# Patient Record
Sex: FEMALE | Race: WHITE | NOT HISPANIC OR LATINO | URBAN - METROPOLITAN AREA
[De-identification: names, ages, dates, MRNs, and addresses within clinical notes are randomized per-mention and may not be internally consistent; named-entity substitution may affect disease eponyms.]

---

## 2017-08-22 ENCOUNTER — INPATIENT (INPATIENT)
Facility: HOSPITAL | Age: 82
LOS: 10 days | Discharge: NURSING, REHAB OR LONG-TERM CARE | DRG: 291 | End: 2017-09-02
Attending: HOSPITALIST
Payer: MEDICARE

## 2017-08-22 VITALS
RESPIRATION RATE: 18 BRPM | HEART RATE: 90 BPM | OXYGEN SATURATION: 100 % | HEIGHT: 61 IN | WEIGHT: 199.96 LBS | SYSTOLIC BLOOD PRESSURE: 167 MMHG | TEMPERATURE: 98 F | DIASTOLIC BLOOD PRESSURE: 85 MMHG

## 2017-08-22 DIAGNOSIS — I10 ESSENTIAL (PRIMARY) HYPERTENSION: ICD-10-CM

## 2017-08-22 DIAGNOSIS — E11.9 TYPE 2 DIABETES MELLITUS WITHOUT COMPLICATIONS: ICD-10-CM

## 2017-08-22 DIAGNOSIS — J44.1 CHRONIC OBSTRUCTIVE PULMONARY DISEASE WITH (ACUTE) EXACERBATION: ICD-10-CM

## 2017-08-22 DIAGNOSIS — J44.9 CHRONIC OBSTRUCTIVE PULMONARY DISEASE, UNSPECIFIED: ICD-10-CM

## 2017-08-22 DIAGNOSIS — I50.9 HEART FAILURE, UNSPECIFIED: ICD-10-CM

## 2017-08-22 DIAGNOSIS — E78.00 PURE HYPERCHOLESTEROLEMIA, UNSPECIFIED: ICD-10-CM

## 2017-08-22 DIAGNOSIS — N17.9 ACUTE KIDNEY FAILURE, UNSPECIFIED: ICD-10-CM

## 2017-08-22 LAB
ALBUMIN SERPL ELPH-MCNC: 4.1 G/DL — SIGNIFICANT CHANGE UP (ref 3.3–5.2)
ALP SERPL-CCNC: 106 U/L — SIGNIFICANT CHANGE UP (ref 40–120)
ALT FLD-CCNC: 20 U/L — SIGNIFICANT CHANGE UP
ANION GAP SERPL CALC-SCNC: 15 MMOL/L — SIGNIFICANT CHANGE UP (ref 5–17)
ANISOCYTOSIS BLD QL: SLIGHT — SIGNIFICANT CHANGE UP
AST SERPL-CCNC: 29 U/L — SIGNIFICANT CHANGE UP
BILIRUB SERPL-MCNC: 0.3 MG/DL — LOW (ref 0.4–2)
BUN SERPL-MCNC: 39 MG/DL — HIGH (ref 8–20)
CALCIUM SERPL-MCNC: 9.1 MG/DL — SIGNIFICANT CHANGE UP (ref 8.6–10.2)
CHLORIDE SERPL-SCNC: 95 MMOL/L — LOW (ref 98–107)
CK SERPL-CCNC: 89 U/L — SIGNIFICANT CHANGE UP (ref 25–170)
CO2 SERPL-SCNC: 21 MMOL/L — LOW (ref 22–29)
CREAT SERPL-MCNC: 1.73 MG/DL — HIGH (ref 0.5–1.3)
EOSINOPHIL NFR BLD AUTO: 1 % — SIGNIFICANT CHANGE UP (ref 0–5)
GLUCOSE SERPL-MCNC: 319 MG/DL — HIGH (ref 70–115)
HCT VFR BLD CALC: 29.9 % — LOW (ref 37–47)
HGB BLD-MCNC: 9.7 G/DL — LOW (ref 12–16)
HYPOCHROMIA BLD QL: SLIGHT — SIGNIFICANT CHANGE UP
LYMPHOCYTES # BLD AUTO: 4 % — LOW (ref 20–55)
MACROCYTES BLD QL: SLIGHT — SIGNIFICANT CHANGE UP
MCHC RBC-ENTMCNC: 29.7 PG — SIGNIFICANT CHANGE UP (ref 27–31)
MCHC RBC-ENTMCNC: 32.4 G/DL — SIGNIFICANT CHANGE UP (ref 32–36)
MCV RBC AUTO: 91.4 FL — SIGNIFICANT CHANGE UP (ref 81–99)
MONOCYTES NFR BLD AUTO: 9 % — SIGNIFICANT CHANGE UP (ref 3–10)
NEUTROPHILS NFR BLD AUTO: 81 % — HIGH (ref 37–73)
NEUTS BAND # BLD: 3 % — SIGNIFICANT CHANGE UP (ref 0–8)
NT-PROBNP SERPL-SCNC: 4145 PG/ML — HIGH (ref 0–300)
OVALOCYTES BLD QL SMEAR: SLIGHT — SIGNIFICANT CHANGE UP
PLAT MORPH BLD: NORMAL — SIGNIFICANT CHANGE UP
PLATELET # BLD AUTO: 86 K/UL — LOW (ref 150–400)
POIKILOCYTOSIS BLD QL AUTO: SLIGHT — SIGNIFICANT CHANGE UP
POTASSIUM SERPL-MCNC: 4.6 MMOL/L — SIGNIFICANT CHANGE UP (ref 3.5–5.3)
POTASSIUM SERPL-SCNC: 4.6 MMOL/L — SIGNIFICANT CHANGE UP (ref 3.5–5.3)
PROT SERPL-MCNC: 7 G/DL — SIGNIFICANT CHANGE UP (ref 6.6–8.7)
RBC # BLD: 3.27 M/UL — LOW (ref 4.4–5.2)
RBC # FLD: 13.5 % — SIGNIFICANT CHANGE UP (ref 11–15.6)
RBC BLD AUTO: ABNORMAL
SODIUM SERPL-SCNC: 131 MMOL/L — LOW (ref 135–145)
TROPONIN T SERPL-MCNC: 0.03 NG/ML — SIGNIFICANT CHANGE UP (ref 0–0.06)
VARIANT LYMPHS # BLD: 2 % — SIGNIFICANT CHANGE UP (ref 0–6)
WBC # BLD: 12.3 K/UL — HIGH (ref 4.8–10.8)
WBC # FLD AUTO: 12.3 K/UL — HIGH (ref 4.8–10.8)

## 2017-08-22 PROCEDURE — 99285 EMERGENCY DEPT VISIT HI MDM: CPT | Mod: 25

## 2017-08-22 PROCEDURE — 93010 ELECTROCARDIOGRAM REPORT: CPT

## 2017-08-22 PROCEDURE — 93306 TTE W/DOPPLER COMPLETE: CPT | Mod: 26

## 2017-08-22 PROCEDURE — 99223 1ST HOSP IP/OBS HIGH 75: CPT | Mod: AI

## 2017-08-22 PROCEDURE — 71020: CPT | Mod: 26

## 2017-08-22 RX ORDER — GLUCAGON INJECTION, SOLUTION 0.5 MG/.1ML
1 INJECTION, SOLUTION SUBCUTANEOUS ONCE
Qty: 0 | Refills: 0 | Status: DISCONTINUED | OUTPATIENT
Start: 2017-08-22 | End: 2017-09-02

## 2017-08-22 RX ORDER — AZITHROMYCIN 500 MG/1
500 TABLET, FILM COATED ORAL ONCE
Qty: 0 | Refills: 0 | Status: COMPLETED | OUTPATIENT
Start: 2017-08-22 | End: 2017-08-22

## 2017-08-22 RX ORDER — DORZOLAMIDE HYDROCHLORIDE 20 MG/ML
1 SOLUTION/ DROPS OPHTHALMIC
Qty: 0 | Refills: 0 | Status: DISCONTINUED | OUTPATIENT
Start: 2017-08-22 | End: 2017-09-02

## 2017-08-22 RX ORDER — METOPROLOL TARTRATE 50 MG
100 TABLET ORAL
Qty: 0 | Refills: 0 | Status: DISCONTINUED | OUTPATIENT
Start: 2017-08-22 | End: 2017-09-02

## 2017-08-22 RX ORDER — SODIUM CHLORIDE 9 MG/ML
1000 INJECTION, SOLUTION INTRAVENOUS
Qty: 0 | Refills: 0 | Status: DISCONTINUED | OUTPATIENT
Start: 2017-08-22 | End: 2017-09-02

## 2017-08-22 RX ORDER — ALBUTEROL 90 UG/1
2.5 AEROSOL, METERED ORAL ONCE
Qty: 0 | Refills: 0 | Status: COMPLETED | OUTPATIENT
Start: 2017-08-22 | End: 2017-08-22

## 2017-08-22 RX ORDER — IPRATROPIUM/ALBUTEROL SULFATE 18-103MCG
3 AEROSOL WITH ADAPTER (GRAM) INHALATION EVERY 6 HOURS
Qty: 0 | Refills: 0 | Status: DISCONTINUED | OUTPATIENT
Start: 2017-08-22 | End: 2017-08-28

## 2017-08-22 RX ORDER — AZITHROMYCIN 500 MG/1
250 TABLET, FILM COATED ORAL DAILY
Qty: 0 | Refills: 0 | Status: COMPLETED | OUTPATIENT
Start: 2017-08-23 | End: 2017-08-26

## 2017-08-22 RX ORDER — DEXTROSE 50 % IN WATER 50 %
25 SYRINGE (ML) INTRAVENOUS ONCE
Qty: 0 | Refills: 0 | Status: DISCONTINUED | OUTPATIENT
Start: 2017-08-22 | End: 2017-09-02

## 2017-08-22 RX ORDER — CLOPIDOGREL BISULFATE 75 MG/1
75 TABLET, FILM COATED ORAL DAILY
Qty: 0 | Refills: 0 | Status: DISCONTINUED | OUTPATIENT
Start: 2017-08-22 | End: 2017-09-02

## 2017-08-22 RX ORDER — MAGNESIUM HYDROXIDE 400 MG/1
15 TABLET, CHEWABLE ORAL AT BEDTIME
Qty: 0 | Refills: 0 | Status: DISCONTINUED | OUTPATIENT
Start: 2017-08-22 | End: 2017-09-02

## 2017-08-22 RX ORDER — SODIUM CHLORIDE 9 MG/ML
3 INJECTION INTRAMUSCULAR; INTRAVENOUS; SUBCUTANEOUS ONCE
Qty: 0 | Refills: 0 | Status: COMPLETED | OUTPATIENT
Start: 2017-08-22 | End: 2017-08-22

## 2017-08-22 RX ORDER — BENZOCAINE AND MENTHOL 5; 1 G/100ML; G/100ML
1 LIQUID ORAL EVERY 6 HOURS
Qty: 0 | Refills: 0 | Status: DISCONTINUED | OUTPATIENT
Start: 2017-08-22 | End: 2017-09-02

## 2017-08-22 RX ORDER — DEXTROSE 50 % IN WATER 50 %
12.5 SYRINGE (ML) INTRAVENOUS ONCE
Qty: 0 | Refills: 0 | Status: DISCONTINUED | OUTPATIENT
Start: 2017-08-22 | End: 2017-09-02

## 2017-08-22 RX ORDER — HYDRALAZINE HCL 50 MG
25 TABLET ORAL
Qty: 0 | Refills: 0 | Status: DISCONTINUED | OUTPATIENT
Start: 2017-08-22 | End: 2017-09-02

## 2017-08-22 RX ORDER — ASCORBIC ACID 60 MG
500 TABLET,CHEWABLE ORAL DAILY
Qty: 0 | Refills: 0 | Status: DISCONTINUED | OUTPATIENT
Start: 2017-08-22 | End: 2017-09-02

## 2017-08-22 RX ORDER — ENOXAPARIN SODIUM 100 MG/ML
40 INJECTION SUBCUTANEOUS EVERY 24 HOURS
Qty: 0 | Refills: 0 | Status: DISCONTINUED | OUTPATIENT
Start: 2017-08-22 | End: 2017-09-02

## 2017-08-22 RX ORDER — LORATADINE 10 MG/1
10 TABLET ORAL DAILY
Qty: 0 | Refills: 0 | Status: DISCONTINUED | OUTPATIENT
Start: 2017-08-22 | End: 2017-09-02

## 2017-08-22 RX ORDER — ACETAMINOPHEN 500 MG
650 TABLET ORAL EVERY 6 HOURS
Qty: 0 | Refills: 0 | Status: DISCONTINUED | OUTPATIENT
Start: 2017-08-22 | End: 2017-09-02

## 2017-08-22 RX ORDER — IPRATROPIUM/ALBUTEROL SULFATE 18-103MCG
3 AEROSOL WITH ADAPTER (GRAM) INHALATION ONCE
Qty: 0 | Refills: 0 | Status: COMPLETED | OUTPATIENT
Start: 2017-08-22 | End: 2017-08-22

## 2017-08-22 RX ORDER — ATORVASTATIN CALCIUM 80 MG/1
40 TABLET, FILM COATED ORAL AT BEDTIME
Qty: 0 | Refills: 0 | Status: DISCONTINUED | OUTPATIENT
Start: 2017-08-22 | End: 2017-09-02

## 2017-08-22 RX ORDER — MULTIVIT-MIN/FERROUS GLUCONATE 9 MG/15 ML
1 LIQUID (ML) ORAL DAILY
Qty: 0 | Refills: 0 | Status: DISCONTINUED | OUTPATIENT
Start: 2017-08-22 | End: 2017-09-02

## 2017-08-22 RX ORDER — MAGNESIUM SULFATE 500 MG/ML
2 VIAL (ML) INJECTION ONCE
Qty: 0 | Refills: 0 | Status: COMPLETED | OUTPATIENT
Start: 2017-08-22 | End: 2017-08-22

## 2017-08-22 RX ORDER — ASPIRIN/CALCIUM CARB/MAGNESIUM 324 MG
81 TABLET ORAL DAILY
Qty: 0 | Refills: 0 | Status: DISCONTINUED | OUTPATIENT
Start: 2017-08-22 | End: 2017-09-02

## 2017-08-22 RX ORDER — CELECOXIB 200 MG/1
200 CAPSULE ORAL
Qty: 0 | Refills: 0 | Status: DISCONTINUED | OUTPATIENT
Start: 2017-08-22 | End: 2017-08-24

## 2017-08-22 RX ORDER — LIDOCAINE 4 G/100G
10 CREAM TOPICAL ONCE
Qty: 0 | Refills: 0 | Status: COMPLETED | OUTPATIENT
Start: 2017-08-22 | End: 2017-08-22

## 2017-08-22 RX ORDER — DEXTROSE 50 % IN WATER 50 %
1 SYRINGE (ML) INTRAVENOUS ONCE
Qty: 0 | Refills: 0 | Status: DISCONTINUED | OUTPATIENT
Start: 2017-08-22 | End: 2017-09-02

## 2017-08-22 RX ORDER — INSULIN LISPRO 100/ML
VIAL (ML) SUBCUTANEOUS
Qty: 0 | Refills: 0 | Status: DISCONTINUED | OUTPATIENT
Start: 2017-08-22 | End: 2017-08-26

## 2017-08-22 RX ORDER — INSULIN GLARGINE 100 [IU]/ML
25 INJECTION, SOLUTION SUBCUTANEOUS AT BEDTIME
Qty: 0 | Refills: 0 | Status: DISCONTINUED | OUTPATIENT
Start: 2017-08-22 | End: 2017-08-31

## 2017-08-22 RX ORDER — LATANOPROST 0.05 MG/ML
1 SOLUTION/ DROPS OPHTHALMIC; TOPICAL AT BEDTIME
Qty: 0 | Refills: 0 | Status: DISCONTINUED | OUTPATIENT
Start: 2017-08-22 | End: 2017-09-02

## 2017-08-22 RX ORDER — FUROSEMIDE 40 MG
40 TABLET ORAL DAILY
Qty: 0 | Refills: 0 | Status: DISCONTINUED | OUTPATIENT
Start: 2017-08-22 | End: 2017-08-24

## 2017-08-22 RX ADMIN — Medication 8: at 18:16

## 2017-08-22 RX ADMIN — Medication 100 MILLIGRAM(S): at 18:36

## 2017-08-22 RX ADMIN — LATANOPROST 1 DROP(S): 0.05 SOLUTION/ DROPS OPHTHALMIC; TOPICAL at 22:39

## 2017-08-22 RX ADMIN — LIDOCAINE 10 MILLILITER(S): 4 CREAM TOPICAL at 06:04

## 2017-08-22 RX ADMIN — BENZOCAINE AND MENTHOL 1 LOZENGE: 5; 1 LIQUID ORAL at 13:02

## 2017-08-22 RX ADMIN — CLOPIDOGREL BISULFATE 75 MILLIGRAM(S): 75 TABLET, FILM COATED ORAL at 13:01

## 2017-08-22 RX ADMIN — Medication 500 MILLIGRAM(S): at 12:59

## 2017-08-22 RX ADMIN — DORZOLAMIDE HYDROCHLORIDE 1 DROP(S): 20 SOLUTION/ DROPS OPHTHALMIC at 18:36

## 2017-08-22 RX ADMIN — Medication 81 MILLIGRAM(S): at 12:57

## 2017-08-22 RX ADMIN — Medication 50 GRAM(S): at 08:03

## 2017-08-22 RX ADMIN — ATORVASTATIN CALCIUM 40 MILLIGRAM(S): 80 TABLET, FILM COATED ORAL at 22:27

## 2017-08-22 RX ADMIN — Medication 100 MILLIGRAM(S): at 18:18

## 2017-08-22 RX ADMIN — ALBUTEROL 2.5 MILLIGRAM(S): 90 AEROSOL, METERED ORAL at 06:04

## 2017-08-22 RX ADMIN — BENZOCAINE AND MENTHOL 1 LOZENGE: 5; 1 LIQUID ORAL at 22:26

## 2017-08-22 RX ADMIN — LORATADINE 10 MILLIGRAM(S): 10 TABLET ORAL at 13:03

## 2017-08-22 RX ADMIN — Medication 3 MILLILITER(S): at 04:20

## 2017-08-22 RX ADMIN — Medication 40 MILLIGRAM(S): at 12:57

## 2017-08-22 RX ADMIN — Medication 1 TABLET(S): at 13:03

## 2017-08-22 RX ADMIN — Medication 30 MILLILITER(S): at 06:04

## 2017-08-22 RX ADMIN — SODIUM CHLORIDE 3 MILLILITER(S): 9 INJECTION INTRAMUSCULAR; INTRAVENOUS; SUBCUTANEOUS at 04:20

## 2017-08-22 RX ADMIN — Medication 8: at 13:01

## 2017-08-22 RX ADMIN — Medication 3 MILLILITER(S): at 20:18

## 2017-08-22 RX ADMIN — ENOXAPARIN SODIUM 40 MILLIGRAM(S): 100 INJECTION SUBCUTANEOUS at 12:57

## 2017-08-22 RX ADMIN — Medication 25 MILLIGRAM(S): at 18:36

## 2017-08-22 RX ADMIN — INSULIN GLARGINE 25 UNIT(S): 100 INJECTION, SOLUTION SUBCUTANEOUS at 22:26

## 2017-08-22 RX ADMIN — Medication 125 MILLIGRAM(S): at 04:20

## 2017-08-22 RX ADMIN — Medication 60 MILLIGRAM(S): at 22:26

## 2017-08-22 RX ADMIN — Medication 3 MILLILITER(S): at 06:04

## 2017-08-22 RX ADMIN — CELECOXIB 200 MILLIGRAM(S): 200 CAPSULE ORAL at 18:16

## 2017-08-22 RX ADMIN — AZITHROMYCIN 500 MILLIGRAM(S): 500 TABLET, FILM COATED ORAL at 12:57

## 2017-08-22 RX ADMIN — Medication 60 MILLIGRAM(S): at 14:36

## 2017-08-22 NOTE — H&P ADULT - ASSESSMENT
89 yr old female resident of [facility] with diabetes mellitus, hypertension, hyperlipidemia, CHF, COPD admitted with complaints of exertional shortness of breath and wheezing. Labs and imaging noted, no infiltrate in CXR. Noted to have DEVORAH, compared to last admission.

## 2017-08-22 NOTE — ED PROVIDER NOTE - OBJECTIVE STATEMENT
89 yo female presents for evaluation of shortness of breath. Patient states for evaluation shortness and wheezing this evening. Patient states that she has been generally well. However, she has noted a mild cough for the past 3 days. Tonight while sleeping she was awake due to dyspnea. She was given a treatment a home with significant improvement.

## 2017-08-22 NOTE — ED ADULT TRIAGE NOTE - CHIEF COMPLAINT QUOTE
marysol arrived via ambulance states that she was having difficulty breathing  about one hour prior to arrival patient received a respitory treatment prior to arrival -states that she has had a cough for a while. carlot on 3 liters nasal cannula with wheezing noted. Md at bedside. lower edema noted

## 2017-08-22 NOTE — ED PROVIDER NOTE - PSH
Artificial pacemaker    History of appendectomy    History of tonsillectomy    S/P PHOEBE (total abdominal hysterectomy)

## 2017-08-22 NOTE — ED ADULT NURSE REASSESSMENT NOTE - NS ED NURSE REASSESS COMMENT FT1
Pt states she feels relief of discomfort, wheezing has subsided, medications to be given for cough and scratchy throat, will continue to monitor.

## 2017-08-22 NOTE — ED ADULT NURSE REASSESSMENT NOTE - NS ED NURSE REASSESS COMMENT FT1
pt received awake and alert resp even and unlabored denies any pain lung sounds clear pt has a non productive cough. pt incont of large amount of soft brown stool care provided. pt skin intact. pt aware she will be admitted to hospital awaiting bed assignment.

## 2017-08-22 NOTE — ED PROVIDER NOTE - PROGRESS NOTE DETAILS
Patient continues to feel much better. Mild scattered wheezing rul and lll. Patient with continued wheezing despite multiple treatments. Will continued for persistent treatment

## 2017-08-22 NOTE — ED ADULT NURSE REASSESSMENT NOTE - NS ED NURSE REASSESS COMMENT FT1
pt care assumed at 1930, no apparent distress noted at this time, charting as noted. pt received Alert and Oriented to person, place, and time laying in bed comfortably. Pt denies any complaints at this time. Pt assisted to bed pan. HR is regular, lung sounds are clear b/l, abd is soft and nontender with positive bowel sounds in all four quadrants, skin is warm, dry and   appropriate for age and race. plan of care explained, will continue to monitor.

## 2017-08-22 NOTE — H&P ADULT - HISTORY OF PRESENT ILLNESS
89 yr old female resident [facility] with diabetes mellitus, hypertension, hyperlipidemia, CHF, COPD admitted with complaints of few days of shortness of breath, worse on exertion. Yesterday, she had a cough with scant sputum. She has chronic leg swelling. No chest pain, dizziness, no palpitations. No fever, chills, nausea, vomiting, bowel/bladder complaints. She is unsure of sick contacts.

## 2017-08-22 NOTE — ED PROVIDER NOTE - PMH
Congestive heart failure (CHF)    COPD (chronic obstructive pulmonary disease)    Diabetes    Hiatal hernia    Hypercholesteremia    Hypertension    Pacemaker

## 2017-08-22 NOTE — ED ADULT NURSE NOTE - OBJECTIVE STATEMENT
Pt states "I was sleeping and woke up and felt like I couldn't breathe, this has happened to me before but they gave me medicine and I felt better but this was different", pt denies pain/n/v, denies dizziness/weakness, LS w/ audible wheeze on expiration, pt states she's had a cough for 1.5 weeks that won't go away

## 2017-08-23 LAB
ANION GAP SERPL CALC-SCNC: 13 MMOL/L — SIGNIFICANT CHANGE UP (ref 5–17)
ANISOCYTOSIS BLD QL: SLIGHT — SIGNIFICANT CHANGE UP
BUN SERPL-MCNC: 48 MG/DL — HIGH (ref 8–20)
CALCIUM SERPL-MCNC: 9.3 MG/DL — SIGNIFICANT CHANGE UP (ref 8.6–10.2)
CHLORIDE SERPL-SCNC: 96 MMOL/L — LOW (ref 98–107)
CO2 SERPL-SCNC: 24 MMOL/L — SIGNIFICANT CHANGE UP (ref 22–29)
CREAT SERPL-MCNC: 1.83 MG/DL — HIGH (ref 0.5–1.3)
GLUCOSE SERPL-MCNC: 376 MG/DL — HIGH (ref 70–115)
HBA1C BLD-MCNC: 8.8 % — HIGH (ref 4–5.6)
HCT VFR BLD CALC: 29.2 % — LOW (ref 37–47)
HGB BLD-MCNC: 9.5 G/DL — LOW (ref 12–16)
HYPOCHROMIA BLD QL: SLIGHT — SIGNIFICANT CHANGE UP
INR BLD: 1.21 RATIO — HIGH (ref 0.88–1.16)
LYMPHOCYTES # BLD AUTO: 0.8 K/UL — LOW (ref 1–4.8)
LYMPHOCYTES # BLD AUTO: 6 % — LOW (ref 20–55)
MACROCYTES BLD QL: SLIGHT — SIGNIFICANT CHANGE UP
MAGNESIUM SERPL-MCNC: 2.7 MG/DL — HIGH (ref 1.6–2.6)
MCHC RBC-ENTMCNC: 29.1 PG — SIGNIFICANT CHANGE UP (ref 27–31)
MCHC RBC-ENTMCNC: 32.5 G/DL — SIGNIFICANT CHANGE UP (ref 32–36)
MCV RBC AUTO: 89.6 FL — SIGNIFICANT CHANGE UP (ref 81–99)
MICROCYTES BLD QL: SLIGHT — SIGNIFICANT CHANGE UP
MONOCYTES # BLD AUTO: 1.2 K/UL — HIGH (ref 0–0.8)
MONOCYTES NFR BLD AUTO: 9 % — SIGNIFICANT CHANGE UP (ref 3–10)
NEUTROPHILS # BLD AUTO: 11.3 K/UL — HIGH (ref 1.8–8)
NEUTROPHILS NFR BLD AUTO: 82 % — HIGH (ref 37–73)
NEUTS BAND # BLD: 3 % — SIGNIFICANT CHANGE UP (ref 0–8)
PHOSPHATE SERPL-MCNC: 3.1 MG/DL — SIGNIFICANT CHANGE UP (ref 2.4–4.7)
PLAT MORPH BLD: NORMAL — SIGNIFICANT CHANGE UP
PLATELET # BLD AUTO: 96 K/UL — LOW (ref 150–400)
POIKILOCYTOSIS BLD QL AUTO: SLIGHT — SIGNIFICANT CHANGE UP
POTASSIUM SERPL-MCNC: 5.4 MMOL/L — HIGH (ref 3.5–5.3)
POTASSIUM SERPL-SCNC: 5.4 MMOL/L — HIGH (ref 3.5–5.3)
PROTHROM AB SERPL-ACNC: 13.4 SEC — HIGH (ref 9.8–12.7)
RBC # BLD: 3.26 M/UL — LOW (ref 4.4–5.2)
RBC # FLD: 13.3 % — SIGNIFICANT CHANGE UP (ref 11–15.6)
RBC BLD AUTO: ABNORMAL
SODIUM SERPL-SCNC: 133 MMOL/L — LOW (ref 135–145)
WBC # BLD: 13.5 K/UL — HIGH (ref 4.8–10.8)
WBC # FLD AUTO: 13.5 K/UL — HIGH (ref 4.8–10.8)

## 2017-08-23 PROCEDURE — 99233 SBSQ HOSP IP/OBS HIGH 50: CPT

## 2017-08-23 RX ORDER — ONDANSETRON 8 MG/1
4 TABLET, FILM COATED ORAL EVERY 6 HOURS
Qty: 0 | Refills: 0 | Status: DISCONTINUED | OUTPATIENT
Start: 2017-08-23 | End: 2017-09-02

## 2017-08-23 RX ORDER — METOCLOPRAMIDE HCL 10 MG
10 TABLET ORAL ONCE
Qty: 0 | Refills: 0 | Status: COMPLETED | OUTPATIENT
Start: 2017-08-23 | End: 2017-08-23

## 2017-08-23 RX ORDER — HYDRALAZINE HCL 50 MG
10 TABLET ORAL ONCE
Qty: 0 | Refills: 0 | Status: COMPLETED | OUTPATIENT
Start: 2017-08-23 | End: 2017-08-23

## 2017-08-23 RX ADMIN — AZITHROMYCIN 250 MILLIGRAM(S): 500 TABLET, FILM COATED ORAL at 12:36

## 2017-08-23 RX ADMIN — DORZOLAMIDE HYDROCHLORIDE 1 DROP(S): 20 SOLUTION/ DROPS OPHTHALMIC at 17:57

## 2017-08-23 RX ADMIN — Medication 1 TABLET(S): at 12:39

## 2017-08-23 RX ADMIN — Medication 8: at 16:36

## 2017-08-23 RX ADMIN — Medication 200 MILLIGRAM(S): at 14:51

## 2017-08-23 RX ADMIN — BENZOCAINE AND MENTHOL 1 LOZENGE: 5; 1 LIQUID ORAL at 06:01

## 2017-08-23 RX ADMIN — Medication 8: at 10:57

## 2017-08-23 RX ADMIN — Medication 25 MILLIGRAM(S): at 04:47

## 2017-08-23 RX ADMIN — Medication 3 MILLILITER(S): at 20:03

## 2017-08-23 RX ADMIN — Medication 200 MILLIGRAM(S): at 11:01

## 2017-08-23 RX ADMIN — ENOXAPARIN SODIUM 40 MILLIGRAM(S): 100 INJECTION SUBCUTANEOUS at 12:37

## 2017-08-23 RX ADMIN — LATANOPROST 1 DROP(S): 0.05 SOLUTION/ DROPS OPHTHALMIC; TOPICAL at 22:32

## 2017-08-23 RX ADMIN — Medication 3 MILLILITER(S): at 15:11

## 2017-08-23 RX ADMIN — Medication 3 MILLILITER(S): at 04:39

## 2017-08-23 RX ADMIN — Medication 100 MILLIGRAM(S): at 06:01

## 2017-08-23 RX ADMIN — Medication 40 MILLIGRAM(S): at 06:00

## 2017-08-23 RX ADMIN — Medication 60 MILLIGRAM(S): at 22:31

## 2017-08-23 RX ADMIN — Medication 10: at 08:25

## 2017-08-23 RX ADMIN — Medication 81 MILLIGRAM(S): at 12:36

## 2017-08-23 RX ADMIN — Medication 3 MILLILITER(S): at 08:51

## 2017-08-23 RX ADMIN — Medication 60 MILLIGRAM(S): at 06:00

## 2017-08-23 RX ADMIN — Medication 10 MILLIGRAM(S): at 22:30

## 2017-08-23 RX ADMIN — BENZOCAINE AND MENTHOL 1 LOZENGE: 5; 1 LIQUID ORAL at 14:51

## 2017-08-23 RX ADMIN — Medication 60 MILLIGRAM(S): at 12:39

## 2017-08-23 RX ADMIN — DORZOLAMIDE HYDROCHLORIDE 1 DROP(S): 20 SOLUTION/ DROPS OPHTHALMIC at 06:00

## 2017-08-23 RX ADMIN — Medication 30 MILLILITER(S): at 23:56

## 2017-08-23 RX ADMIN — CLOPIDOGREL BISULFATE 75 MILLIGRAM(S): 75 TABLET, FILM COATED ORAL at 12:36

## 2017-08-23 RX ADMIN — ONDANSETRON 4 MILLIGRAM(S): 8 TABLET, FILM COATED ORAL at 19:17

## 2017-08-23 RX ADMIN — Medication 500 MILLIGRAM(S): at 12:36

## 2017-08-23 RX ADMIN — INSULIN GLARGINE 25 UNIT(S): 100 INJECTION, SOLUTION SUBCUTANEOUS at 22:37

## 2017-08-23 RX ADMIN — CELECOXIB 200 MILLIGRAM(S): 200 CAPSULE ORAL at 06:00

## 2017-08-23 RX ADMIN — Medication 100 MILLIGRAM(S): at 04:47

## 2017-08-23 NOTE — PROGRESS NOTE ADULT - ASSESSMENT
89 yr old female resident of [facility] with diabetes mellitus, hypertension, hyperlipidemia, CHF, COPD admitted with complaints of exertional shortness of breath and wheezing. She was started on IV steroids for COPD exacerbation and Lasix for CHF exacerbation. CXR negative for infiltrate. Noted to have mild elevation on creatinine, which per family, has chronic problems with her kidneys. ECHO was done, which revealed improved EF and diastolic dysfunction.

## 2017-08-23 NOTE — PROGRESS NOTE ADULT - SUBJECTIVE AND OBJECTIVE BOX
INTERVAL HPI/OVERNIGHT EVENTS:    CC: COPD exacerbation, CHF exacerbation, diastolic, hypertension, diabetes mellitus, CKD    No overnight events, has dry cough, no fever.    Vital Signs Last 24 Hrs  T(C): 36.9 (23 Aug 2017 08:00), Max: 37.2 (23 Aug 2017 04:23)  T(F): 98.4 (23 Aug 2017 08:00), Max: 98.9 (23 Aug 2017 04:23)  HR: 78 (23 Aug 2017 08:52) (72 - 89)  BP: 145/82 (23 Aug 2017 08:00) (121/54 - 182/93)  BP(mean): --  RR: 18 (23 Aug 2017 08:00) (18 - 22)  SpO2: 99% (23 Aug 2017 08:52) (97% - 100%)    PHYSICAL EXAM:    GENERAL: Not in distress, alert  CHEST/LUNG: b/l air entry, wheezing  HEART: Regular   ABDOMEN: Soft, BS+  EXTREMITIES:  No edema, tenderness    MEDICATIONS  (STANDING):  aspirin  chewable 81 milliGRAM(s) Oral daily  celecoxib 200 milliGRAM(s) Oral two times a day before meals  loratadine 10 milliGRAM(s) Oral daily  clopidogrel Tablet 75 milliGRAM(s) Oral daily  metoprolol 100 milliGRAM(s) Oral two times a day  dorzolamide 2% Ophthalmic Solution 1 Drop(s) Both EYES two times a day  latanoprost 0.005% Ophthalmic Solution 1 Drop(s) Both EYES at bedtime  hydrALAZINE 25 milliGRAM(s) Oral two times a day  multivitamin/minerals 1 Tablet(s) Oral daily  ascorbic acid 500 milliGRAM(s) Oral daily  insulin glargine Injectable (LANTUS) 25 Unit(s) SubCutaneous at bedtime  insulin lispro (HumaLOG) corrective regimen sliding scale   SubCutaneous three times a day before meals  dextrose 5%. 1000 milliLiter(s) (50 mL/Hr) IV Continuous <Continuous>  dextrose 50% Injectable 12.5 Gram(s) IV Push once  dextrose 50% Injectable 25 Gram(s) IV Push once  dextrose 50% Injectable 25 Gram(s) IV Push once  methylPREDNISolone sodium succinate Injectable 60 milliGRAM(s) IV Push every 8 hours  ALBUTerol/ipratropium for Nebulization 3 milliLiter(s) Nebulizer every 6 hours  furosemide   Injectable 40 milliGRAM(s) IV Push daily  enoxaparin Injectable 40 milliGRAM(s) SubCutaneous every 24 hours  atorvastatin 40 milliGRAM(s) Oral at bedtime  azithromycin   Tablet 250 milliGRAM(s) Oral daily  guaiFENesin   Syrup  (Sugar-Free) 200 milliGRAM(s) Oral every 4 hours    MEDICATIONS  (PRN):  acetaminophen   Tablet 650 milliGRAM(s) Oral every 6 hours PRN For Temp greater than 38 C (100.4 F)  acetaminophen   Tablet. 650 milliGRAM(s) Oral every 6 hours PRN Moderate Pain (4 - 6)  magnesium hydroxide Suspension 15 milliLiter(s) Oral at bedtime PRN Constipation  bisacodyl Suppository 10 milliGRAM(s) Rectal daily PRN Constipation  dextrose Gel 1 Dose(s) Oral once PRN Blood Glucose LESS THAN 70 milliGRAM(s)/deciliter  glucagon  Injectable 1 milliGRAM(s) IntraMuscular once PRN Glucose LESS THAN 70 milligrams/deciliter  benzocaine 15 mG/menthol 3.6 mG Lozenge 1 Lozenge Oral every 6 hours PRN Sore Throat      Allergies    No Known Drug Allergies  shellfish (Flushing (Skin); Hives)    Intolerances    codeine (Nausea)        LABS:                          9.5    13.5  )-----------( 96       ( 23 Aug 2017 07:28 )             29.2     08-23    133<L>  |  96<L>  |  48.0<H>  ----------------------------<  376<H>  5.4<H>   |  24.0  |  1.83<H>    Ca    9.3      23 Aug 2017 07:28  Phos  3.1     08-23  Mg     2.7     08-23    TPro  7.0  /  Alb  4.1  /  TBili  0.3<L>  /  DBili  x   /  AST  29  /  ALT  20  /  AlkPhos  106  08-22    PT/INR - ( 23 Aug 2017 07:28 )   PT: 13.4 sec;   INR: 1.21 ratio               RADIOLOGY & ADDITIONAL TESTS:

## 2017-08-24 LAB
ANION GAP SERPL CALC-SCNC: 19 MMOL/L — HIGH (ref 5–17)
BUN SERPL-MCNC: 55 MG/DL — HIGH (ref 8–20)
CALCIUM SERPL-MCNC: 9.6 MG/DL — SIGNIFICANT CHANGE UP (ref 8.6–10.2)
CHLORIDE SERPL-SCNC: 92 MMOL/L — LOW (ref 98–107)
CO2 SERPL-SCNC: 24 MMOL/L — SIGNIFICANT CHANGE UP (ref 22–29)
CREAT SERPL-MCNC: 2.27 MG/DL — HIGH (ref 0.5–1.3)
GLUCOSE SERPL-MCNC: 395 MG/DL — HIGH (ref 70–115)
HCT VFR BLD CALC: 31.3 % — LOW (ref 37–47)
HGB BLD-MCNC: 10.3 G/DL — LOW (ref 12–16)
MAGNESIUM SERPL-MCNC: 2.7 MG/DL — HIGH (ref 1.6–2.6)
MCHC RBC-ENTMCNC: 29.4 PG — SIGNIFICANT CHANGE UP (ref 27–31)
MCHC RBC-ENTMCNC: 32.9 G/DL — SIGNIFICANT CHANGE UP (ref 32–36)
MCV RBC AUTO: 89.4 FL — SIGNIFICANT CHANGE UP (ref 81–99)
PLATELET # BLD AUTO: 119 K/UL — LOW (ref 150–400)
POTASSIUM SERPL-MCNC: 4.4 MMOL/L — SIGNIFICANT CHANGE UP (ref 3.5–5.3)
POTASSIUM SERPL-SCNC: 4.4 MMOL/L — SIGNIFICANT CHANGE UP (ref 3.5–5.3)
RBC # BLD: 3.5 M/UL — LOW (ref 4.4–5.2)
RBC # FLD: 13.5 % — SIGNIFICANT CHANGE UP (ref 11–15.6)
SODIUM SERPL-SCNC: 135 MMOL/L — SIGNIFICANT CHANGE UP (ref 135–145)
WBC # BLD: 14.7 K/UL — HIGH (ref 4.8–10.8)
WBC # FLD AUTO: 14.7 K/UL — HIGH (ref 4.8–10.8)

## 2017-08-24 PROCEDURE — 99233 SBSQ HOSP IP/OBS HIGH 50: CPT

## 2017-08-24 PROCEDURE — 93010 ELECTROCARDIOGRAM REPORT: CPT

## 2017-08-24 RX ORDER — PANTOPRAZOLE SODIUM 20 MG/1
40 TABLET, DELAYED RELEASE ORAL
Qty: 0 | Refills: 0 | Status: DISCONTINUED | OUTPATIENT
Start: 2017-08-24 | End: 2017-08-27

## 2017-08-24 RX ORDER — INSULIN LISPRO 100/ML
10 VIAL (ML) SUBCUTANEOUS ONCE
Qty: 0 | Refills: 0 | Status: COMPLETED | OUTPATIENT
Start: 2017-08-24 | End: 2017-08-24

## 2017-08-24 RX ADMIN — Medication 10: at 08:28

## 2017-08-24 RX ADMIN — Medication 40 MILLIGRAM(S): at 21:56

## 2017-08-24 RX ADMIN — Medication 60 MILLIGRAM(S): at 05:47

## 2017-08-24 RX ADMIN — CLOPIDOGREL BISULFATE 75 MILLIGRAM(S): 75 TABLET, FILM COATED ORAL at 11:47

## 2017-08-24 RX ADMIN — Medication 25 MILLIGRAM(S): at 05:47

## 2017-08-24 RX ADMIN — Medication 2: at 17:31

## 2017-08-24 RX ADMIN — Medication 500 MILLIGRAM(S): at 11:47

## 2017-08-24 RX ADMIN — Medication 200 MILLIGRAM(S): at 05:47

## 2017-08-24 RX ADMIN — ATORVASTATIN CALCIUM 40 MILLIGRAM(S): 80 TABLET, FILM COATED ORAL at 21:56

## 2017-08-24 RX ADMIN — LATANOPROST 1 DROP(S): 0.05 SOLUTION/ DROPS OPHTHALMIC; TOPICAL at 21:56

## 2017-08-24 RX ADMIN — CELECOXIB 200 MILLIGRAM(S): 200 CAPSULE ORAL at 05:58

## 2017-08-24 RX ADMIN — Medication 3 MILLILITER(S): at 08:19

## 2017-08-24 RX ADMIN — PANTOPRAZOLE SODIUM 40 MILLIGRAM(S): 20 TABLET, DELAYED RELEASE ORAL at 17:31

## 2017-08-24 RX ADMIN — DORZOLAMIDE HYDROCHLORIDE 1 DROP(S): 20 SOLUTION/ DROPS OPHTHALMIC at 17:31

## 2017-08-24 RX ADMIN — Medication 200 MILLIGRAM(S): at 11:46

## 2017-08-24 RX ADMIN — Medication 81 MILLIGRAM(S): at 11:48

## 2017-08-24 RX ADMIN — Medication 30 MILLILITER(S): at 05:48

## 2017-08-24 RX ADMIN — Medication 10 UNIT(S): at 22:37

## 2017-08-24 RX ADMIN — ONDANSETRON 4 MILLIGRAM(S): 8 TABLET, FILM COATED ORAL at 05:51

## 2017-08-24 RX ADMIN — Medication 200 MILLIGRAM(S): at 21:56

## 2017-08-24 RX ADMIN — Medication 100 MILLIGRAM(S): at 17:32

## 2017-08-24 RX ADMIN — AZITHROMYCIN 250 MILLIGRAM(S): 500 TABLET, FILM COATED ORAL at 14:00

## 2017-08-24 RX ADMIN — Medication 1 TABLET(S): at 11:48

## 2017-08-24 RX ADMIN — Medication 30 MILLILITER(S): at 17:33

## 2017-08-24 RX ADMIN — INSULIN GLARGINE 25 UNIT(S): 100 INJECTION, SOLUTION SUBCUTANEOUS at 21:56

## 2017-08-24 RX ADMIN — ENOXAPARIN SODIUM 40 MILLIGRAM(S): 100 INJECTION SUBCUTANEOUS at 11:50

## 2017-08-24 RX ADMIN — Medication 100 MILLIGRAM(S): at 05:47

## 2017-08-24 RX ADMIN — Medication 40 MILLIGRAM(S): at 14:05

## 2017-08-24 RX ADMIN — LORATADINE 10 MILLIGRAM(S): 10 TABLET ORAL at 11:48

## 2017-08-24 RX ADMIN — Medication 3 MILLILITER(S): at 15:30

## 2017-08-24 RX ADMIN — Medication 25 MILLIGRAM(S): at 17:33

## 2017-08-24 RX ADMIN — Medication 8: at 11:46

## 2017-08-24 RX ADMIN — Medication 40 MILLIGRAM(S): at 05:47

## 2017-08-24 RX ADMIN — Medication 200 MILLIGRAM(S): at 14:01

## 2017-08-24 RX ADMIN — DORZOLAMIDE HYDROCHLORIDE 1 DROP(S): 20 SOLUTION/ DROPS OPHTHALMIC at 05:47

## 2017-08-24 RX ADMIN — Medication 3 MILLILITER(S): at 20:22

## 2017-08-24 RX ADMIN — Medication 200 MILLIGRAM(S): at 17:32

## 2017-08-24 NOTE — PROGRESS NOTE ADULT - ASSESSMENT
89 yr old female resident of [facility] with diabetes mellitus, hypertension, hyperlipidemia, CHF, COPD admitted with complaints of exertional shortness of breath and wheezing. She was started on IV steroids for COPD exacerbation and Lasix for CHF exacerbation. CXR negative for infiltrate. Noted to have mild elevation on creatinine, which per family, has chronic problems with her kidneys. ECHO was done, which revealed improved EF and diastolic dysfunction. Her kidney function worsened. Renal was consulted. Her steroids were tapered.

## 2017-08-24 NOTE — PROGRESS NOTE ADULT - SUBJECTIVE AND OBJECTIVE BOX
INTERVAL HPI/OVERNIGHT EVENTS:    CC: COPD exacerbation, hypertension, DEVORAH on CKD, diabetes mellitus, hyperlipidemia    Feels less short of breath, cough better. Had nausea last night, now resolved.    Vital Signs Last 24 Hrs  T(C): 37 (24 Aug 2017 08:58), Max: 37.2 (24 Aug 2017 00:32)  T(F): 98.6 (24 Aug 2017 08:58), Max: 98.9 (24 Aug 2017 00:32)  HR: 81 (24 Aug 2017 08:58) (66 - 100)  BP: 154/85 (24 Aug 2017 08:58) (123/71 - 170/86)  BP(mean): --  RR: 18 (24 Aug 2017 08:58) (18 - 20)  SpO2: 97% (24 Aug 2017 08:58) (97% - 99%)    PHYSICAL EXAM:    GENERAL: Not in distress, alert  CHEST/LUNG: b/l wheezing, improved.  HEART: Regular   ABDOMEN: Soft, BS+  EXTREMITIES: no edema, tenderness    MEDICATIONS  (STANDING):  aspirin  chewable 81 milliGRAM(s) Oral daily  celecoxib 200 milliGRAM(s) Oral two times a day before meals  loratadine 10 milliGRAM(s) Oral daily  clopidogrel Tablet 75 milliGRAM(s) Oral daily  metoprolol 100 milliGRAM(s) Oral two times a day  dorzolamide 2% Ophthalmic Solution 1 Drop(s) Both EYES two times a day  latanoprost 0.005% Ophthalmic Solution 1 Drop(s) Both EYES at bedtime  hydrALAZINE 25 milliGRAM(s) Oral two times a day  multivitamin/minerals 1 Tablet(s) Oral daily  ascorbic acid 500 milliGRAM(s) Oral daily  insulin glargine Injectable (LANTUS) 25 Unit(s) SubCutaneous at bedtime  insulin lispro (HumaLOG) corrective regimen sliding scale   SubCutaneous three times a day before meals  dextrose 5%. 1000 milliLiter(s) (50 mL/Hr) IV Continuous <Continuous>  dextrose 50% Injectable 12.5 Gram(s) IV Push once  dextrose 50% Injectable 25 Gram(s) IV Push once  dextrose 50% Injectable 25 Gram(s) IV Push once  methylPREDNISolone sodium succinate Injectable 60 milliGRAM(s) IV Push every 8 hours  ALBUTerol/ipratropium for Nebulization 3 milliLiter(s) Nebulizer every 6 hours  furosemide   Injectable 40 milliGRAM(s) IV Push daily  enoxaparin Injectable 40 milliGRAM(s) SubCutaneous every 24 hours  atorvastatin 40 milliGRAM(s) Oral at bedtime  azithromycin   Tablet 250 milliGRAM(s) Oral daily  guaiFENesin   Syrup  (Sugar-Free) 200 milliGRAM(s) Oral every 4 hours    MEDICATIONS  (PRN):  acetaminophen   Tablet 650 milliGRAM(s) Oral every 6 hours PRN For Temp greater than 38 C (100.4 F)  acetaminophen   Tablet. 650 milliGRAM(s) Oral every 6 hours PRN Moderate Pain (4 - 6)  magnesium hydroxide Suspension 15 milliLiter(s) Oral at bedtime PRN Constipation  bisacodyl Suppository 10 milliGRAM(s) Rectal daily PRN Constipation  dextrose Gel 1 Dose(s) Oral once PRN Blood Glucose LESS THAN 70 milliGRAM(s)/deciliter  glucagon  Injectable 1 milliGRAM(s) IntraMuscular once PRN Glucose LESS THAN 70 milligrams/deciliter  benzocaine 15 mG/menthol 3.6 mG Lozenge 1 Lozenge Oral every 6 hours PRN Sore Throat  ondansetron Injectable 4 milliGRAM(s) IV Push every 6 hours PRN Nausea and/or Vomiting  aluminum hydroxide/magnesium hydroxide/simethicone Suspension 30 milliLiter(s) Oral every 4 hours PRN Dyspepsia      Allergies    No Known Drug Allergies  shellfish (Flushing (Skin); Hives)    Intolerances    codeine (Nausea)        LABS:                          10.3   14.7  )-----------( 119      ( 24 Aug 2017 07:44 )             31.3     08-24    135  |  92<L>  |  55.0<H>  ----------------------------<  395<H>  4.4   |  24.0  |  2.27<H>    Ca    9.6      24 Aug 2017 07:42  Phos  3.1     08-23  Mg     2.7     08-24      PT/INR - ( 23 Aug 2017 07:28 )   PT: 13.4 sec;   INR: 1.21 ratio               RADIOLOGY & ADDITIONAL TESTS:

## 2017-08-24 NOTE — CONSULT NOTE ADULT - SUBJECTIVE AND OBJECTIVE BOX
Patient is a 89y old  Female who presents with a chief complaint of "pains in legs and thigh" (23 Aug 2017 00:26)  S Creat rising now      HPI:  89 yr old female resident [facility] with diabetes mellitus, hypertension, hyperlipidemia, CHF, COPD admitted with complaints of few days of shortness of breath, worse on exertion. She also had a cough with scant sputum.   She has chronic leg swelling. Unsure re any hx CKD.  No chest pain, dizziness, no palpitations. No fever, chills, nausea, vomiting, bowel/bladder complaints. She is unsure of sick contacts. (22 Aug 2017 16:25)      PAST MEDICAL & SURGICAL HISTORY:  Congestive heart failure (CHF)  Hiatal hernia  Diabetes  Hypercholesteremia  Hypertension  Pacemaker  COPD (chronic obstructive pulmonary disease)  History of appendectomy  History of tonsillectomy  S/P PHOEBE (total abdominal hysterectomy)  Artificial pacemaker      FAMILY HISTORY:  No pertinent family history in first degree relatives      Social History:    Allergies    No Known Drug Allergies  shellfish (Flushing (Skin); Hives)    Intolerances codeine (Nausea)    REVIEW OF SYSTEMS:    CONSTITUTIONAL: No fever, weight loss, or fatigue  EYES: No eye pain or visual disturbances,   RESPIRATORY: mild cough, no hemoptysis; + SOB, better now  CARDIOVASCULAR: No chest pain, palpitations, + leg swelling  GASTROINTESTINAL: No abdominal , NVD or GIB  GENITOURINARY: No UTI sx  NEUROLOGICAL: No headaches/wk/numbness  MUSCULOSKELETAL: No joint pain or swelling; No muscle, back, or extremity pain      Vital Signs Last 24 Hrs  T(C): 36.7 (24 Aug 2017 15:43), Max: 37.2 (24 Aug 2017 00:32)  T(F): 98 (24 Aug 2017 15:43), Max: 98.9 (24 Aug 2017 00:32)  HR: 67 (24 Aug 2017 15:43) (67 - 100)  BP: 131/72 (24 Aug 2017 15:43) (123/71 - 170/86)  BP(mean): --  RR: 18 (24 Aug 2017 15:43) (18 - 20)  SpO2: 98% (24 Aug 2017 15:43) (97% - 99%)    PHYSICAL EXAM:    GENERAL: NAD, Pleasant  EYES:  conjunctiva and sclera clear  NECK: Supple, No JVD/Bruit, Normal thyroid  NERVOUS SYSTEM:  A/O x3,   CHEST/LUNG: No rales, few rhonchi,    HEART: Regular rate and rhythm; No murmurs, rubs, or gallops  ABDOMEN: Soft, NT/ND BS+  EXTREMITIES:  + Peripheral Pulses, mild edema   SKIN: No rashes or lesions      LABS:                        10.3   14.7  )-----------( 119      ( 24 Aug 2017 07:44 )             31.3     08-24    135  |  92<L>  |  55.0<H>  ----------------------------<  395<H>  4.4   |  24.0  |  2.27<H>    Ca    9.6      24 Aug 2017 07:42  Phos  3.1     08-23  Mg     2.7     08-24      PT/INR - ( 23 Aug 2017 07:28 )   PT: 13.4 sec;   INR: 1.21 ratio        Magnesium, Serum: 2.7 mg/dL (08-24 @ 07:42)    CXR clear  UA - NA    MEDICATIONS  (STANDING):  acetaminophen   Tablet PRN  acetaminophen   Tablet. PRN  aspirin  chewable  celecoxib  magnesium hydroxide Suspension PRN  loratadine  clopidogrel Tablet  metoprolol  bisacodyl Suppository PRN  dorzolamide 2% Ophthalmic Solution  latanoprost 0.005% Ophthalmic Solution  hydrALAZINE  multivitamin/minerals  ascorbic acid  insulin glargine Injectable (LANTUS)  insulin lispro (HumaLOG) corrective regimen sliding scale  dextrose 5%.  dextrose Gel PRN  dextrose 50% Injectable  dextrose 50% Injectable  dextrose 50% Injectable  glucagon  Injectable PRN  ALBUTerol/ipratropium for Nebulization  enoxaparin Injectable  atorvastatin  benzocaine 15 mG/menthol 3.6 mG Lozenge PRN  azithromycin   Tablet  guaiFENesin   Syrup  (Sugar-Free)  ondansetron Injectable PRN  methylPREDNISolone sodium succinate Injectable  pantoprazole    Tablet  aluminum hydroxide/magnesium hydroxide/simethicone Suspension PRN    Assesment/Plan :  Patient likely has CKD d/t long standing Htn and IDDM; need to find baseline creatinine from old records  rise in creat could be d/t NASIDs, not on any diuretics or aCEI etc  d/c or decrease Celebrex  check UA, Ueos, Sono  24 H urine studies, DON  further w/u as needed  shall follow; thanks

## 2017-08-25 LAB
ALBUMIN SERPL ELPH-MCNC: 4 G/DL — SIGNIFICANT CHANGE UP (ref 3.3–5.2)
ALP SERPL-CCNC: 89 U/L — SIGNIFICANT CHANGE UP (ref 40–120)
ALT FLD-CCNC: 20 U/L — SIGNIFICANT CHANGE UP
ANION GAP SERPL CALC-SCNC: 16 MMOL/L — SIGNIFICANT CHANGE UP (ref 5–17)
AST SERPL-CCNC: 26 U/L — SIGNIFICANT CHANGE UP
BILIRUB SERPL-MCNC: 0.3 MG/DL — LOW (ref 0.4–2)
BUN SERPL-MCNC: 68 MG/DL — HIGH (ref 8–20)
CALCIUM SERPL-MCNC: 9.7 MG/DL — SIGNIFICANT CHANGE UP (ref 8.6–10.2)
CHLORIDE SERPL-SCNC: 94 MMOL/L — LOW (ref 98–107)
CO2 SERPL-SCNC: 28 MMOL/L — SIGNIFICANT CHANGE UP (ref 22–29)
CREAT SERPL-MCNC: 2.27 MG/DL — HIGH (ref 0.5–1.3)
GLUCOSE SERPL-MCNC: 241 MG/DL — HIGH (ref 70–115)
HCT VFR BLD CALC: 33.8 % — LOW (ref 37–47)
HGB BLD-MCNC: 10.7 G/DL — LOW (ref 12–16)
MCHC RBC-ENTMCNC: 29.2 PG — SIGNIFICANT CHANGE UP (ref 27–31)
MCHC RBC-ENTMCNC: 31.7 G/DL — LOW (ref 32–36)
MCV RBC AUTO: 92.1 FL — SIGNIFICANT CHANGE UP (ref 81–99)
PLATELET # BLD AUTO: 119 K/UL — LOW (ref 150–400)
POTASSIUM SERPL-MCNC: 4.8 MMOL/L — SIGNIFICANT CHANGE UP (ref 3.5–5.3)
POTASSIUM SERPL-SCNC: 4.8 MMOL/L — SIGNIFICANT CHANGE UP (ref 3.5–5.3)
PROT SERPL-MCNC: 6.9 G/DL — SIGNIFICANT CHANGE UP (ref 6.6–8.7)
RBC # BLD: 3.67 M/UL — LOW (ref 4.4–5.2)
RBC # FLD: 13.4 % — SIGNIFICANT CHANGE UP (ref 11–15.6)
SODIUM SERPL-SCNC: 138 MMOL/L — SIGNIFICANT CHANGE UP (ref 135–145)
WBC # BLD: 15.8 K/UL — HIGH (ref 4.8–10.8)
WBC # FLD AUTO: 15.8 K/UL — HIGH (ref 4.8–10.8)

## 2017-08-25 PROCEDURE — 71010: CPT | Mod: 26

## 2017-08-25 PROCEDURE — 76775 US EXAM ABDO BACK WALL LIM: CPT | Mod: 26

## 2017-08-25 PROCEDURE — 99233 SBSQ HOSP IP/OBS HIGH 50: CPT

## 2017-08-25 RX ADMIN — ATORVASTATIN CALCIUM 40 MILLIGRAM(S): 80 TABLET, FILM COATED ORAL at 21:36

## 2017-08-25 RX ADMIN — Medication 81 MILLIGRAM(S): at 11:10

## 2017-08-25 RX ADMIN — Medication 25 MILLIGRAM(S): at 16:01

## 2017-08-25 RX ADMIN — Medication 40 MILLIGRAM(S): at 21:36

## 2017-08-25 RX ADMIN — INSULIN GLARGINE 25 UNIT(S): 100 INJECTION, SOLUTION SUBCUTANEOUS at 21:37

## 2017-08-25 RX ADMIN — MAGNESIUM HYDROXIDE 15 MILLILITER(S): 400 TABLET, CHEWABLE ORAL at 06:10

## 2017-08-25 RX ADMIN — LORATADINE 10 MILLIGRAM(S): 10 TABLET ORAL at 11:11

## 2017-08-25 RX ADMIN — Medication 500 MILLIGRAM(S): at 16:00

## 2017-08-25 RX ADMIN — Medication 8: at 15:59

## 2017-08-25 RX ADMIN — Medication 40 MILLIGRAM(S): at 11:12

## 2017-08-25 RX ADMIN — Medication 200 MILLIGRAM(S): at 06:10

## 2017-08-25 RX ADMIN — Medication 200 MILLIGRAM(S): at 10:15

## 2017-08-25 RX ADMIN — DORZOLAMIDE HYDROCHLORIDE 1 DROP(S): 20 SOLUTION/ DROPS OPHTHALMIC at 06:09

## 2017-08-25 RX ADMIN — Medication 8: at 11:14

## 2017-08-25 RX ADMIN — BENZOCAINE AND MENTHOL 1 LOZENGE: 5; 1 LIQUID ORAL at 08:28

## 2017-08-25 RX ADMIN — ENOXAPARIN SODIUM 40 MILLIGRAM(S): 100 INJECTION SUBCUTANEOUS at 11:15

## 2017-08-25 RX ADMIN — Medication 1 TABLET(S): at 11:11

## 2017-08-25 RX ADMIN — Medication 25 MILLIGRAM(S): at 06:10

## 2017-08-25 RX ADMIN — Medication 3 MILLILITER(S): at 20:31

## 2017-08-25 RX ADMIN — PANTOPRAZOLE SODIUM 40 MILLIGRAM(S): 20 TABLET, DELAYED RELEASE ORAL at 06:10

## 2017-08-25 RX ADMIN — Medication 40 MILLIGRAM(S): at 06:09

## 2017-08-25 RX ADMIN — LATANOPROST 1 DROP(S): 0.05 SOLUTION/ DROPS OPHTHALMIC; TOPICAL at 21:36

## 2017-08-25 RX ADMIN — Medication 3 MILLILITER(S): at 09:15

## 2017-08-25 RX ADMIN — CLOPIDOGREL BISULFATE 75 MILLIGRAM(S): 75 TABLET, FILM COATED ORAL at 11:11

## 2017-08-25 RX ADMIN — Medication 100 MILLIGRAM(S): at 16:01

## 2017-08-25 RX ADMIN — AZITHROMYCIN 250 MILLIGRAM(S): 500 TABLET, FILM COATED ORAL at 16:00

## 2017-08-25 RX ADMIN — Medication 4: at 08:26

## 2017-08-25 RX ADMIN — DORZOLAMIDE HYDROCHLORIDE 1 DROP(S): 20 SOLUTION/ DROPS OPHTHALMIC at 16:01

## 2017-08-25 RX ADMIN — Medication 100 MILLIGRAM(S): at 06:10

## 2017-08-25 NOTE — PROGRESS NOTE ADULT - SUBJECTIVE AND OBJECTIVE BOX
Patient seen and examined    Feels fine  no c/o CP SOB NV Cough less  No swelling feet    Vital Signs Last 24 Hrs  T(C): 36.8 (08-25-17 @ 16:03), Max: 36.8 (08-24-17 @ 23:43)  T(F): 98.3 (08-25-17 @ 16:03), Max: 98.3 (08-25-17 @ 16:03)  HR: 71 (08-25-17 @ 16:03) (69 - 75)  BP: 156/73 (08-25-17 @ 16:03) (137/70 - 166/83)  BP(mean): --  RR: 18 (08-25-17 @ 16:03) (18 - 18)  SpO2: 98% (08-25-17 @ 16:03) (94% - 98%)    Chest   clear  CV   no murmur  Abd   soft, NT BS+  Extr   No edema  Neuro  grossly iintact motor        25 Aug 2017 08:38    138    |  94     |  68.0   ----------------------------<  241    4.8     |  28.0   |  2.27     Ca    9.7        25 Aug 2017 08:38  Mg     2.7       24 Aug 2017 07:42    TPro  6.9    /  Alb  4.0    /  TBili  0.3    /  DBili  x      /  AST  26     /  ALT  20     /  AlkPhos  89     25 Aug 2017 08:38                          10.7   15.8  )-----------( 119      ( 25 Aug 2017 08:38 )             33.8     sono -ve  CXR clear    Likely has CKD; off celebrex now  check 24 H urine studies  watch creat off NSAIDs

## 2017-08-25 NOTE — PROGRESS NOTE ADULT - SUBJECTIVE AND OBJECTIVE BOX
INTERVAL HPI/OVERNIGHT EVENTS:    CC: COPD exacerbation, DEVORAH, hypertension, CHF diastolic, diabetes mellitus    No overnight events, persistent dry cough. No fever.     Vital Signs Last 24 Hrs  T(C): 36.7 (25 Aug 2017 09:43), Max: 36.8 (24 Aug 2017 23:43)  T(F): 98.1 (25 Aug 2017 09:43), Max: 98.2 (24 Aug 2017 23:43)  HR: 69 (25 Aug 2017 09:43) (67 - 75)  BP: 166/83 (25 Aug 2017 09:43) (131/72 - 166/83)  BP(mean): --  RR: 18 (25 Aug 2017 09:43) (18 - 18)  SpO2: 97% (25 Aug 2017 09:43) (94% - 98%)    PHYSICAL EXAM:    GENERAL: Not in distress, alert, sitting in chair  CHEST/LUNG: improved wheezing.  HEART: Regular   ABDOMEN: Soft, BS+  EXTREMITIES:  No edema, tenderness    MEDICATIONS  (STANDING):  aspirin  chewable 81 milliGRAM(s) Oral daily  loratadine 10 milliGRAM(s) Oral daily  clopidogrel Tablet 75 milliGRAM(s) Oral daily  metoprolol 100 milliGRAM(s) Oral two times a day  dorzolamide 2% Ophthalmic Solution 1 Drop(s) Both EYES two times a day  latanoprost 0.005% Ophthalmic Solution 1 Drop(s) Both EYES at bedtime  hydrALAZINE 25 milliGRAM(s) Oral two times a day  multivitamin/minerals 1 Tablet(s) Oral daily  ascorbic acid 500 milliGRAM(s) Oral daily  insulin glargine Injectable (LANTUS) 25 Unit(s) SubCutaneous at bedtime  insulin lispro (HumaLOG) corrective regimen sliding scale   SubCutaneous three times a day before meals  dextrose 5%. 1000 milliLiter(s) (50 mL/Hr) IV Continuous <Continuous>  dextrose 50% Injectable 12.5 Gram(s) IV Push once  dextrose 50% Injectable 25 Gram(s) IV Push once  dextrose 50% Injectable 25 Gram(s) IV Push once  ALBUTerol/ipratropium for Nebulization 3 milliLiter(s) Nebulizer every 6 hours  enoxaparin Injectable 40 milliGRAM(s) SubCutaneous every 24 hours  atorvastatin 40 milliGRAM(s) Oral at bedtime  azithromycin   Tablet 250 milliGRAM(s) Oral daily  guaiFENesin   Syrup  (Sugar-Free) 200 milliGRAM(s) Oral every 4 hours  methylPREDNISolone sodium succinate Injectable 40 milliGRAM(s) IV Push every 8 hours  pantoprazole    Tablet 40 milliGRAM(s) Oral before breakfast    MEDICATIONS  (PRN):  acetaminophen   Tablet 650 milliGRAM(s) Oral every 6 hours PRN For Temp greater than 38 C (100.4 F)  acetaminophen   Tablet. 650 milliGRAM(s) Oral every 6 hours PRN Moderate Pain (4 - 6)  magnesium hydroxide Suspension 15 milliLiter(s) Oral at bedtime PRN Constipation  bisacodyl Suppository 10 milliGRAM(s) Rectal daily PRN Constipation  dextrose Gel 1 Dose(s) Oral once PRN Blood Glucose LESS THAN 70 milliGRAM(s)/deciliter  glucagon  Injectable 1 milliGRAM(s) IntraMuscular once PRN Glucose LESS THAN 70 milligrams/deciliter  benzocaine 15 mG/menthol 3.6 mG Lozenge 1 Lozenge Oral every 6 hours PRN Sore Throat  ondansetron Injectable 4 milliGRAM(s) IV Push every 6 hours PRN Nausea and/or Vomiting  aluminum hydroxide/magnesium hydroxide/simethicone Suspension 30 milliLiter(s) Oral every 4 hours PRN Dyspepsia      Allergies    No Known Drug Allergies  shellfish (Flushing (Skin); Hives)    Intolerances    codeine (Nausea)        LABS:                          10.7   15.8  )-----------( 119      ( 25 Aug 2017 08:38 )             33.8     08-25    138  |  94<L>  |  68.0<H>  ----------------------------<  241<H>  4.8   |  28.0  |  2.27<H>    Ca    9.7      25 Aug 2017 08:38  Mg     2.7     08-24    TPro  6.9  /  Alb  4.0  /  TBili  0.3<L>  /  DBili  x   /  AST  26  /  ALT  20  /  AlkPhos  89  08-25          RADIOLOGY & ADDITIONAL TESTS:

## 2017-08-25 NOTE — PROGRESS NOTE ADULT - ASSESSMENT
89 yr old female resident of [facility] with diabetes mellitus, hypertension, hyperlipidemia, CHF, COPD admitted with complaints of exertional shortness of breath and wheezing. She was started on IV steroids for COPD exacerbation and Lasix for CHF exacerbation. CXR negative for infiltrate. Noted to have mild elevation on creatinine, which per family, has chronic problems with her kidneys. ECHO was done, which revealed improved EF and diastolic dysfunction. Her kidney function worsened. Renal was consulted. Her steroids were tapered. Given persistent cough, CXR ordered. Renal US ordered.

## 2017-08-26 DIAGNOSIS — N18.4 CHRONIC KIDNEY DISEASE, STAGE 4 (SEVERE): ICD-10-CM

## 2017-08-26 LAB
ANION GAP SERPL CALC-SCNC: 14 MMOL/L — SIGNIFICANT CHANGE UP (ref 5–17)
BUN SERPL-MCNC: 71 MG/DL — HIGH (ref 8–20)
CALCIUM SERPL-MCNC: 9.2 MG/DL — SIGNIFICANT CHANGE UP (ref 8.6–10.2)
CHLORIDE SERPL-SCNC: 97 MMOL/L — LOW (ref 98–107)
CO2 SERPL-SCNC: 26 MMOL/L — SIGNIFICANT CHANGE UP (ref 22–29)
CREAT SERPL-MCNC: 2 MG/DL — HIGH (ref 0.5–1.3)
GLUCOSE SERPL-MCNC: 231 MG/DL — HIGH (ref 70–115)
HCT VFR BLD CALC: 34.9 % — LOW (ref 37–47)
HGB BLD-MCNC: 11.1 G/DL — LOW (ref 12–16)
MAGNESIUM SERPL-MCNC: 3.4 MG/DL — HIGH (ref 1.6–2.6)
MCHC RBC-ENTMCNC: 28.9 PG — SIGNIFICANT CHANGE UP (ref 27–31)
MCHC RBC-ENTMCNC: 31.8 G/DL — LOW (ref 32–36)
MCV RBC AUTO: 90.9 FL — SIGNIFICANT CHANGE UP (ref 81–99)
PLATELET # BLD AUTO: 152 K/UL — SIGNIFICANT CHANGE UP (ref 150–400)
POTASSIUM SERPL-MCNC: 4.5 MMOL/L — SIGNIFICANT CHANGE UP (ref 3.5–5.3)
POTASSIUM SERPL-SCNC: 4.5 MMOL/L — SIGNIFICANT CHANGE UP (ref 3.5–5.3)
RBC # BLD: 3.84 M/UL — LOW (ref 4.4–5.2)
RBC # FLD: 13.6 % — SIGNIFICANT CHANGE UP (ref 11–15.6)
SODIUM SERPL-SCNC: 137 MMOL/L — SIGNIFICANT CHANGE UP (ref 135–145)
WBC # BLD: 23.4 K/UL — HIGH (ref 4.8–10.8)
WBC # FLD AUTO: 23.4 K/UL — HIGH (ref 4.8–10.8)

## 2017-08-26 PROCEDURE — 99233 SBSQ HOSP IP/OBS HIGH 50: CPT

## 2017-08-26 RX ORDER — NYSTATIN 500MM UNIT
500000 POWDER (EA) MISCELLANEOUS
Qty: 0 | Refills: 0 | Status: DISCONTINUED | OUTPATIENT
Start: 2017-08-26 | End: 2017-09-02

## 2017-08-26 RX ORDER — INSULIN LISPRO 100/ML
VIAL (ML) SUBCUTANEOUS
Qty: 0 | Refills: 0 | Status: DISCONTINUED | OUTPATIENT
Start: 2017-08-26 | End: 2017-09-02

## 2017-08-26 RX ADMIN — Medication 3 MILLILITER(S): at 20:11

## 2017-08-26 RX ADMIN — PANTOPRAZOLE SODIUM 40 MILLIGRAM(S): 20 TABLET, DELAYED RELEASE ORAL at 06:03

## 2017-08-26 RX ADMIN — CLOPIDOGREL BISULFATE 75 MILLIGRAM(S): 75 TABLET, FILM COATED ORAL at 11:41

## 2017-08-26 RX ADMIN — Medication 500 MILLIGRAM(S): at 11:40

## 2017-08-26 RX ADMIN — Medication 40 MILLIGRAM(S): at 06:03

## 2017-08-26 RX ADMIN — Medication 30 MILLILITER(S): at 16:29

## 2017-08-26 RX ADMIN — Medication 100 MILLIGRAM(S): at 18:20

## 2017-08-26 RX ADMIN — Medication 3 MILLILITER(S): at 09:14

## 2017-08-26 RX ADMIN — Medication 40 MILLIGRAM(S): at 18:20

## 2017-08-26 RX ADMIN — Medication 25 MILLIGRAM(S): at 18:20

## 2017-08-26 RX ADMIN — ENOXAPARIN SODIUM 40 MILLIGRAM(S): 100 INJECTION SUBCUTANEOUS at 11:42

## 2017-08-26 RX ADMIN — Medication 3 MILLILITER(S): at 14:51

## 2017-08-26 RX ADMIN — Medication 81 MILLIGRAM(S): at 11:40

## 2017-08-26 RX ADMIN — INSULIN GLARGINE 25 UNIT(S): 100 INJECTION, SOLUTION SUBCUTANEOUS at 22:28

## 2017-08-26 RX ADMIN — ATORVASTATIN CALCIUM 40 MILLIGRAM(S): 80 TABLET, FILM COATED ORAL at 22:28

## 2017-08-26 RX ADMIN — AZITHROMYCIN 250 MILLIGRAM(S): 500 TABLET, FILM COATED ORAL at 11:41

## 2017-08-26 RX ADMIN — Medication 4: at 11:40

## 2017-08-26 RX ADMIN — Medication 2: at 07:57

## 2017-08-26 RX ADMIN — Medication 25 MILLIGRAM(S): at 06:03

## 2017-08-26 RX ADMIN — BENZOCAINE AND MENTHOL 1 LOZENGE: 5; 1 LIQUID ORAL at 18:21

## 2017-08-26 RX ADMIN — Medication 30 MILLILITER(S): at 06:03

## 2017-08-26 RX ADMIN — LATANOPROST 1 DROP(S): 0.05 SOLUTION/ DROPS OPHTHALMIC; TOPICAL at 22:29

## 2017-08-26 RX ADMIN — DORZOLAMIDE HYDROCHLORIDE 1 DROP(S): 20 SOLUTION/ DROPS OPHTHALMIC at 06:04

## 2017-08-26 RX ADMIN — DORZOLAMIDE HYDROCHLORIDE 1 DROP(S): 20 SOLUTION/ DROPS OPHTHALMIC at 18:20

## 2017-08-26 RX ADMIN — LORATADINE 10 MILLIGRAM(S): 10 TABLET ORAL at 11:41

## 2017-08-26 RX ADMIN — Medication 1 TABLET(S): at 11:41

## 2017-08-26 RX ADMIN — Medication 100 MILLIGRAM(S): at 06:03

## 2017-08-26 RX ADMIN — Medication 4: at 16:29

## 2017-08-26 NOTE — PROGRESS NOTE ADULT - ASSESSMENT
89 yr old female resident of [facility] with diabetes mellitus, hypertension, hyperlipidemia, CHF, COPD admitted with complaints of exertional shortness of breath and wheezing. She was started on IV steroids for COPD exacerbation and Lasix for CHF exacerbation. CXR negative for infiltrate. Noted to have mild elevation on creatinine, which per family, has chronic problems with her kidneys. ECHO was done, which revealed improved EF and diastolic dysfunction. Her kidney function worsened. Renal was consulted. Her steroids were tapered.     > Gastritis - continue PPI.  D/w patient and daughter regarding further workup with GI as outpatient. 89 yr old female resident of [facility] with diabetes mellitus, hypertension, hyperlipidemia, CHF, COPD admitted with complaints of exertional shortness of breath and wheezing. She was started on IV steroids for COPD exacerbation and Lasix for CHF exacerbation. CXR negative for infiltrate. Noted to have mild elevation on creatinine, which per family, has chronic problems with her kidneys. ECHO was done, which revealed improved EF and diastolic dysfunction. Her kidney function worsened. Renal was consulted. Her steroids were tapered.     > Gastritis - continue PPI.  D/w patient and daughter regarding further workup with GI as outpatient.      ... See below.

## 2017-08-26 NOTE — PROGRESS NOTE ADULT - PROBLEM SELECTOR PLAN 3
Continue Lantus, sliding scale coverage. Continue Lantus, sliding scale coverage.  FS have been better controlled with steroid taper.

## 2017-08-26 NOTE — PROGRESS NOTE ADULT - SUBJECTIVE AND OBJECTIVE BOX
Patient is a 89y old  Female who presents with a chief complaint of burning in throat.            MEDICATIONS  (STANDING):  aspirin  chewable 81 milliGRAM(s) Oral daily  loratadine 10 milliGRAM(s) Oral daily  clopidogrel Tablet 75 milliGRAM(s) Oral daily  metoprolol 100 milliGRAM(s) Oral two times a day  dorzolamide 2% Ophthalmic Solution 1 Drop(s) Both EYES two times a day  latanoprost 0.005% Ophthalmic Solution 1 Drop(s) Both EYES at bedtime  hydrALAZINE 25 milliGRAM(s) Oral two times a day  multivitamin/minerals 1 Tablet(s) Oral daily  ascorbic acid 500 milliGRAM(s) Oral daily  insulin glargine Injectable (LANTUS) 25 Unit(s) SubCutaneous at bedtime  insulin lispro (HumaLOG) corrective regimen sliding scale   SubCutaneous three times a day before meals  dextrose 5%. 1000 milliLiter(s) (50 mL/Hr) IV Continuous <Continuous>  dextrose 50% Injectable 12.5 Gram(s) IV Push once  dextrose 50% Injectable 25 Gram(s) IV Push once  dextrose 50% Injectable 25 Gram(s) IV Push once  ALBUTerol/ipratropium for Nebulization 3 milliLiter(s) Nebulizer every 6 hours  enoxaparin Injectable 40 milliGRAM(s) SubCutaneous every 24 hours  atorvastatin 40 milliGRAM(s) Oral at bedtime  guaiFENesin   Syrup  (Sugar-Free) 200 milliGRAM(s) Oral every 4 hours  pantoprazole    Tablet 40 milliGRAM(s) Oral before breakfast  methylPREDNISolone sodium succinate Injectable 40 milliGRAM(s) IV Push every 12 hours    MEDICATIONS  (PRN):  acetaminophen   Tablet 650 milliGRAM(s) Oral every 6 hours PRN For Temp greater than 38 C (100.4 F)  acetaminophen   Tablet. 650 milliGRAM(s) Oral every 6 hours PRN Moderate Pain (4 - 6)  magnesium hydroxide Suspension 15 milliLiter(s) Oral at bedtime PRN Constipation  bisacodyl Suppository 10 milliGRAM(s) Rectal daily PRN Constipation  dextrose Gel 1 Dose(s) Oral once PRN Blood Glucose LESS THAN 70 milliGRAM(s)/deciliter  glucagon  Injectable 1 milliGRAM(s) IntraMuscular once PRN Glucose LESS THAN 70 milligrams/deciliter  benzocaine 15 mG/menthol 3.6 mG Lozenge 1 Lozenge Oral every 6 hours PRN Sore Throat  ondansetron Injectable 4 milliGRAM(s) IV Push every 6 hours PRN Nausea and/or Vomiting  aluminum hydroxide/magnesium hydroxide/simethicone Suspension 30 milliLiter(s) Oral every 4 hours PRN Dyspepsia                            11.1   23.4  )-----------( 152      ( 26 Aug 2017 08:46 )             34.9       08-26    137  |  97<L>  |  71.0<H>  ----------------------------<  231<H>  4.5   |  26.0  |  2.00<H>    Ca    9.2      26 Aug 2017 08:46  Mg     3.4     08-26    TPro  6.9  /  Alb  4.0  /  TBili  0.3<L>  /  DBili  x   /  AST  26  /  ALT  20  /  AlkPhos  89  08-25      ICU Vital Signs Last 24 Hrs  T(C): 36.7 (26 Aug 2017 08:34), Max: 36.8 (25 Aug 2017 16:03)  T(F): 98.1 (26 Aug 2017 08:34), Max: 98.3 (25 Aug 2017 16:03)  HR: 60 (26 Aug 2017 11:46) (58 - 71)  BP: 169/79 (26 Aug 2017 08:34) (139/69 - 169/79)  BP(mean): --  ABP: --  ABP(mean): --  RR: 18 (26 Aug 2017 08:34) (18 - 18)  SpO2: 97% (26 Aug 2017 11:46) (94% - 98%)      I&O's Summary      PHYSICAL EXAM:      Constitutional: Acutely and chronically ill    Eyes: wnl    ENMT:    Neck: veins not full    Breasts:    Back:    Respiratory: crackles right base, clear left    Cardiovascular: no  gallop, rub    Gastrointestinal: soft not tender    Genitourinary: neg    Rectal:    Extremities: trace edema    Vascular:    Neurological: awake alert    Skin:    Lymph Nodes:    Musculoskeletal: joint  changes OA diffuse    Psychiatric:

## 2017-08-26 NOTE — PROGRESS NOTE ADULT - SUBJECTIVE AND OBJECTIVE BOX
Patient: MAURICIO GONSALES [id number] 89y Female                 Internal Medicine Hospitalist Progress Note - Dr. Umang Badillo    Chief Complaint: Patient is a 89y old  Female who presents with a chief complaint of "pains in legs and thigh" (23 Aug 2017 00:26)    HPI:  89 yr old female resident [facility] with diabetes mellitus, hypertension, hyperlipidemia, CHF, COPD, Second hand smoke exposure admitted with complaints of exertional shortness of breath and wheezing. She was started on IV steroids for COPD exacerbation and Lasix for CHF exacerbation. CXR negative for infiltrate. Noted to have mild elevation on creatinine, which per family, has chronic problems with her kidneys. ECHO was done, which revealed improved EF and diastolic dysfunction. Her kidney function worsened. Renal was consulted. Her steroids were tapered. Renal Ultrasound unremarkable.    Today, denies SOB.  Cough improving.  No fever / chills.  C/o mild "heartburn" - midepigastric burning sensation with po intake, mild, improving.     ____________________PHYSICAL EXAM:  Vitals reviewed as indicated below  GENERAL:  NAD Alert and Oriented x 3   HEENT: NCAT  CARDIOVASCULAR:  S1, S2  LUNGS: coarse BS b/l  ABDOMEN:  soft, (-) tenderness, (-) distension, (+) bowel sounds, (-) guarding, (-) rebound (-) rigidity  EXTREMITIES:  no cyanosis / clubbing / edema.   ____________________    BACKGROUND:  HEALTH ISSUES - PROBLEM Dx:  DEVORAH (acute kidney injury): DEVORAH (acute kidney injury)  Hypertension: Hypertension  Hypercholesteremia: Hypercholesteremia  Diabetes: Diabetes  Congestive heart failure (CHF): Congestive heart failure (CHF)  COPD exacerbation: COPD exacerbation        Allergies    No Known Drug Allergies  shellfish (Flushing (Skin); Hives)    Intolerances    codeine (Nausea)    PAST MEDICAL & SURGICAL HISTORY:  Congestive heart failure (CHF)  Hiatal hernia  Diabetes  Hypercholesteremia  Hypertension  Pacemaker  COPD (chronic obstructive pulmonary disease)  History of appendectomy  History of tonsillectomy  S/P PHOEBE (total abdominal hysterectomy)  Artificial pacemaker      VITALS:  Vital Signs Last 24 Hrs  T(C): 36.7 (26 Aug 2017 08:34), Max: 36.8 (25 Aug 2017 16:03)  T(F): 98.1 (26 Aug 2017 08:34), Max: 98.3 (25 Aug 2017 16:03)  HR: 62 (26 Aug 2017 08:34) (58 - 71)  BP: 169/79 (26 Aug 2017 08:34) (139/69 - 169/79)  BP(mean): --  RR: 18 (26 Aug 2017 08:34) (18 - 18)  SpO2: 94% (26 Aug 2017 08:34) (94% - 98%) Daily     Daily   CAPILLARY BLOOD GLUCOSE  195 (26 Aug 2017 07:54)  196 (26 Aug 2017 03:13)  183 (25 Aug 2017 21:34)  304 (25 Aug 2017 16:29)  313 (25 Aug 2017 11:22)        I&O's Summary      LABS:                        11.1   23.4  )-----------( 152      ( 26 Aug 2017 08:46 )             34.9     08-26    137  |  97<L>  |  71.0<H>  ----------------------------<  231<H>  4.5   |  26.0  |  2.00<H>    Ca    9.2      26 Aug 2017 08:46  Mg     3.4     08-26    TPro  6.9  /  Alb  4.0  /  TBili  0.3<L>  /  DBili  x   /  AST  26  /  ALT  20  /  AlkPhos  89  08-25      LIVER FUNCTIONS - ( 25 Aug 2017 08:38 )  Alb: 4.0 g/dL / Pro: 6.9 g/dL / ALK PHOS: 89 U/L / ALT: 20 U/L / AST: 26 U/L / GGT: x                   MEDICATIONS:  MEDICATIONS  (STANDING):  aspirin  chewable 81 milliGRAM(s) Oral daily  loratadine 10 milliGRAM(s) Oral daily  clopidogrel Tablet 75 milliGRAM(s) Oral daily  metoprolol 100 milliGRAM(s) Oral two times a day  dorzolamide 2% Ophthalmic Solution 1 Drop(s) Both EYES two times a day  latanoprost 0.005% Ophthalmic Solution 1 Drop(s) Both EYES at bedtime  hydrALAZINE 25 milliGRAM(s) Oral two times a day  multivitamin/minerals 1 Tablet(s) Oral daily  ascorbic acid 500 milliGRAM(s) Oral daily  insulin glargine Injectable (LANTUS) 25 Unit(s) SubCutaneous at bedtime  insulin lispro (HumaLOG) corrective regimen sliding scale   SubCutaneous three times a day before meals  dextrose 5%. 1000 milliLiter(s) (50 mL/Hr) IV Continuous <Continuous>  dextrose 50% Injectable 12.5 Gram(s) IV Push once  dextrose 50% Injectable 25 Gram(s) IV Push once  dextrose 50% Injectable 25 Gram(s) IV Push once  ALBUTerol/ipratropium for Nebulization 3 milliLiter(s) Nebulizer every 6 hours  enoxaparin Injectable 40 milliGRAM(s) SubCutaneous every 24 hours  atorvastatin 40 milliGRAM(s) Oral at bedtime  azithromycin   Tablet 250 milliGRAM(s) Oral daily  guaiFENesin   Syrup  (Sugar-Free) 200 milliGRAM(s) Oral every 4 hours  pantoprazole    Tablet 40 milliGRAM(s) Oral before breakfast  methylPREDNISolone sodium succinate Injectable 40 milliGRAM(s) IV Push every 12 hours    MEDICATIONS  (PRN):  acetaminophen   Tablet 650 milliGRAM(s) Oral every 6 hours PRN For Temp greater than 38 C (100.4 F)  acetaminophen   Tablet. 650 milliGRAM(s) Oral every 6 hours PRN Moderate Pain (4 - 6)  magnesium hydroxide Suspension 15 milliLiter(s) Oral at bedtime PRN Constipation  bisacodyl Suppository 10 milliGRAM(s) Rectal daily PRN Constipation  dextrose Gel 1 Dose(s) Oral once PRN Blood Glucose LESS THAN 70 milliGRAM(s)/deciliter  glucagon  Injectable 1 milliGRAM(s) IntraMuscular once PRN Glucose LESS THAN 70 milligrams/deciliter  benzocaine 15 mG/menthol 3.6 mG Lozenge 1 Lozenge Oral every 6 hours PRN Sore Throat  ondansetron Injectable 4 milliGRAM(s) IV Push every 6 hours PRN Nausea and/or Vomiting  aluminum hydroxide/magnesium hydroxide/simethicone Suspension 30 milliLiter(s) Oral every 4 hours PRN Dyspepsia

## 2017-08-26 NOTE — PROGRESS NOTE ADULT - PROBLEM SELECTOR PLAN 1
Continue steroids, duoneb, supplemental oxygen.  Taper Solumedrol  Contacted pt's daughter in law Hazel at her request.  I discussed advanced directives with the patient / family - made aware of limited prognosis if patient were to be intubated or rescuscitated, in consideration of age and comorbid conditions.  Wished to discuss further with family before deciding. Continue steroids, duoneb, supplemental oxygen.  Taper Solumedrol.  OOB with PT.    Contacted pt's daughter in law Hazel at her request.  I discussed advanced directives with the patient / family - made aware of limited prognosis if patient were to be intubated or rescuscitated, in consideration of age and comorbid conditions.  Wished to discuss further with family before deciding.

## 2017-08-27 LAB
ANION GAP SERPL CALC-SCNC: 14 MMOL/L — SIGNIFICANT CHANGE UP (ref 5–17)
BUN SERPL-MCNC: 69 MG/DL — HIGH (ref 8–20)
CALCIUM SERPL-MCNC: 8.8 MG/DL — SIGNIFICANT CHANGE UP (ref 8.6–10.2)
CHLORIDE SERPL-SCNC: 99 MMOL/L — SIGNIFICANT CHANGE UP (ref 98–107)
CO2 SERPL-SCNC: 25 MMOL/L — SIGNIFICANT CHANGE UP (ref 22–29)
CREAT SERPL-MCNC: 1.9 MG/DL — HIGH (ref 0.5–1.3)
GLUCOSE SERPL-MCNC: 218 MG/DL — HIGH (ref 70–115)
HCT VFR BLD CALC: 35.2 % — LOW (ref 37–47)
HGB BLD-MCNC: 11.4 G/DL — LOW (ref 12–16)
MCHC RBC-ENTMCNC: 29.4 PG — SIGNIFICANT CHANGE UP (ref 27–31)
MCHC RBC-ENTMCNC: 32.4 G/DL — SIGNIFICANT CHANGE UP (ref 32–36)
MCV RBC AUTO: 90.7 FL — SIGNIFICANT CHANGE UP (ref 81–99)
PLATELET # BLD AUTO: 139 K/UL — LOW (ref 150–400)
POTASSIUM SERPL-MCNC: 4 MMOL/L — SIGNIFICANT CHANGE UP (ref 3.5–5.3)
POTASSIUM SERPL-SCNC: 4 MMOL/L — SIGNIFICANT CHANGE UP (ref 3.5–5.3)
RBC # BLD: 3.88 M/UL — LOW (ref 4.4–5.2)
RBC # FLD: 13.6 % — SIGNIFICANT CHANGE UP (ref 11–15.6)
SODIUM SERPL-SCNC: 138 MMOL/L — SIGNIFICANT CHANGE UP (ref 135–145)
WBC # BLD: 24.1 K/UL — HIGH (ref 4.8–10.8)
WBC # FLD AUTO: 24.1 K/UL — HIGH (ref 4.8–10.8)

## 2017-08-27 PROCEDURE — 99233 SBSQ HOSP IP/OBS HIGH 50: CPT

## 2017-08-27 RX ORDER — PANTOPRAZOLE SODIUM 20 MG/1
40 TABLET, DELAYED RELEASE ORAL
Qty: 0 | Refills: 0 | Status: DISCONTINUED | OUTPATIENT
Start: 2017-08-27 | End: 2017-09-02

## 2017-08-27 RX ADMIN — Medication 6: at 21:57

## 2017-08-27 RX ADMIN — Medication 40 MILLIGRAM(S): at 17:01

## 2017-08-27 RX ADMIN — LORATADINE 10 MILLIGRAM(S): 10 TABLET ORAL at 11:58

## 2017-08-27 RX ADMIN — Medication 3 MILLILITER(S): at 03:49

## 2017-08-27 RX ADMIN — Medication 25 MILLIGRAM(S): at 17:06

## 2017-08-27 RX ADMIN — Medication 100 MILLIGRAM(S): at 17:06

## 2017-08-27 RX ADMIN — Medication 81 MILLIGRAM(S): at 12:00

## 2017-08-27 RX ADMIN — LATANOPROST 1 DROP(S): 0.05 SOLUTION/ DROPS OPHTHALMIC; TOPICAL at 21:58

## 2017-08-27 RX ADMIN — PANTOPRAZOLE SODIUM 40 MILLIGRAM(S): 20 TABLET, DELAYED RELEASE ORAL at 06:16

## 2017-08-27 RX ADMIN — Medication 500000 UNIT(S): at 17:02

## 2017-08-27 RX ADMIN — ATORVASTATIN CALCIUM 40 MILLIGRAM(S): 80 TABLET, FILM COATED ORAL at 21:57

## 2017-08-27 RX ADMIN — DORZOLAMIDE HYDROCHLORIDE 1 DROP(S): 20 SOLUTION/ DROPS OPHTHALMIC at 06:12

## 2017-08-27 RX ADMIN — Medication 25 MILLIGRAM(S): at 06:16

## 2017-08-27 RX ADMIN — CLOPIDOGREL BISULFATE 75 MILLIGRAM(S): 75 TABLET, FILM COATED ORAL at 11:58

## 2017-08-27 RX ADMIN — PANTOPRAZOLE SODIUM 40 MILLIGRAM(S): 20 TABLET, DELAYED RELEASE ORAL at 17:01

## 2017-08-27 RX ADMIN — Medication 500 MILLIGRAM(S): at 11:58

## 2017-08-27 RX ADMIN — INSULIN GLARGINE 25 UNIT(S): 100 INJECTION, SOLUTION SUBCUTANEOUS at 21:57

## 2017-08-27 RX ADMIN — Medication 40 MILLIGRAM(S): at 06:15

## 2017-08-27 RX ADMIN — Medication 1 TABLET(S): at 11:58

## 2017-08-27 RX ADMIN — Medication 500000 UNIT(S): at 12:00

## 2017-08-27 RX ADMIN — ENOXAPARIN SODIUM 40 MILLIGRAM(S): 100 INJECTION SUBCUTANEOUS at 11:58

## 2017-08-27 RX ADMIN — Medication 2: at 11:58

## 2017-08-27 RX ADMIN — Medication 4: at 07:28

## 2017-08-27 RX ADMIN — Medication 500000 UNIT(S): at 06:15

## 2017-08-27 RX ADMIN — Medication 500000 UNIT(S): at 23:08

## 2017-08-27 RX ADMIN — Medication 100 MILLIGRAM(S): at 06:16

## 2017-08-27 RX ADMIN — Medication 6: at 17:00

## 2017-08-27 RX ADMIN — DORZOLAMIDE HYDROCHLORIDE 1 DROP(S): 20 SOLUTION/ DROPS OPHTHALMIC at 17:01

## 2017-08-27 NOTE — PROGRESS NOTE ADULT - SUBJECTIVE AND OBJECTIVE BOX
Patient: MAURICIO GONSALES [id number] 89y Female                 Internal Medicine Hospitalist Progress Note - Dr. Umang Badillo    Chief Complaint: Patient is a 89y old  Female who presents with a chief complaint of "pains in legs and thigh" (23 Aug 2017 00:26)    HPI:  89 yr old female resident of [facility] with diabetes mellitus, hypertension, hyperlipidemia, CHF, COPD, Second hand smoke exposure admitted with complaints of exertional shortness of breath and wheezing. She was started on IV steroids for COPD exacerbation and Lasix for CHF exacerbation. CXR negative for infiltrate. Noted to have mild elevation on creatinine, which per family, has chronic problems with her kidneys. ECHO was done, which revealed improved EF and diastolic dysfunction. Her kidney function worsened. Renal was consulted. Her steroids were tapered. Renal Ultrasound unremarkable.    Denies SOB.  C/o persistent "heartburn" with po intake.  Tolerating limited po.  No n/v/d/c.  No additional complaints.     ____________________PHYSICAL EXAM:  Vitals reviewed as indicated below  GENERAL:  NAD Alert and Oriented x 3   HEENT: NCAT  CARDIOVASCULAR:  S1, S2  LUNGS: coarse BS b/l  ABDOMEN:  soft, (-) tenderness, (-) distension, (+) bowel sounds, (-) guarding, (-) rebound (-) rigidity  EXTREMITIES:  no cyanosis / clubbing / edema.   ____________________    VITALS:  Vital Signs Last 24 Hrs  T(C): 36.6 (26 Aug 2017 23:30), Max: 36.9 (26 Aug 2017 15:45)  T(F): 97.9 (26 Aug 2017 23:30), Max: 98.4 (26 Aug 2017 15:45)  HR: 69 (26 Aug 2017 23:30) (69 - 80)  BP: 132/79 (26 Aug 2017 23:30) (131/76 - 134/74)  BP(mean): --  RR: 18 (26 Aug 2017 23:30) (18 - 18)  SpO2: 96% (26 Aug 2017 23:30) (96% - 96%) Daily     Daily   CAPILLARY BLOOD GLUCOSE  199 (27 Aug 2017 11:54)  208 (27 Aug 2017 07:25)  206 (26 Aug 2017 16:25)        I&O's Summary      LABS:                        11.4   24.1  )-----------( 139      ( 27 Aug 2017 07:49 )             35.2     08-27    138  |  99  |  69.0<H>  ----------------------------<  218<H>  4.0   |  25.0  |  1.90<H>    Ca    8.8      27 Aug 2017 07:49  Mg     3.4     08-26                  MEDICATIONS:  acetaminophen   Tablet 650 milliGRAM(s) Oral every 6 hours PRN  acetaminophen   Tablet. 650 milliGRAM(s) Oral every 6 hours PRN  aspirin  chewable 81 milliGRAM(s) Oral daily  magnesium hydroxide Suspension 15 milliLiter(s) Oral at bedtime PRN  loratadine 10 milliGRAM(s) Oral daily  clopidogrel Tablet 75 milliGRAM(s) Oral daily  metoprolol 100 milliGRAM(s) Oral two times a day  bisacodyl Suppository 10 milliGRAM(s) Rectal daily PRN  dorzolamide 2% Ophthalmic Solution 1 Drop(s) Both EYES two times a day  latanoprost 0.005% Ophthalmic Solution 1 Drop(s) Both EYES at bedtime  hydrALAZINE 25 milliGRAM(s) Oral two times a day  multivitamin/minerals 1 Tablet(s) Oral daily  ascorbic acid 500 milliGRAM(s) Oral daily  insulin glargine Injectable (LANTUS) 25 Unit(s) SubCutaneous at bedtime  dextrose 5%. 1000 milliLiter(s) IV Continuous <Continuous>  dextrose Gel 1 Dose(s) Oral once PRN  dextrose 50% Injectable 12.5 Gram(s) IV Push once  dextrose 50% Injectable 25 Gram(s) IV Push once  dextrose 50% Injectable 25 Gram(s) IV Push once  glucagon  Injectable 1 milliGRAM(s) IntraMuscular once PRN  ALBUTerol/ipratropium for Nebulization 3 milliLiter(s) Nebulizer every 6 hours  enoxaparin Injectable 40 milliGRAM(s) SubCutaneous every 24 hours  atorvastatin 40 milliGRAM(s) Oral at bedtime  benzocaine 15 mG/menthol 3.6 mG Lozenge 1 Lozenge Oral every 6 hours PRN  guaiFENesin   Syrup  (Sugar-Free) 200 milliGRAM(s) Oral every 4 hours  ondansetron Injectable 4 milliGRAM(s) IV Push every 6 hours PRN  aluminum hydroxide/magnesium hydroxide/simethicone Suspension 30 milliLiter(s) Oral every 4 hours PRN  methylPREDNISolone sodium succinate Injectable 40 milliGRAM(s) IV Push every 12 hours  nystatin    Suspension [id number] Unit(s) Oral four times a day  insulin lispro (HumaLOG) corrective regimen sliding scale   SubCutaneous Before meals and at bedtime  pantoprazole    Tablet 40 milliGRAM(s) Oral two times a day before meals

## 2017-08-27 NOTE — PROGRESS NOTE ADULT - SUBJECTIVE AND OBJECTIVE BOX
Patient is a 89y old  Female who presents with a chief complaint of " heart burn " whenever  she eats or drinks.            MEDICATIONS  (STANDING):  aspirin  chewable 81 milliGRAM(s) Oral daily  loratadine 10 milliGRAM(s) Oral daily  clopidogrel Tablet 75 milliGRAM(s) Oral daily  metoprolol 100 milliGRAM(s) Oral two times a day  dorzolamide 2% Ophthalmic Solution 1 Drop(s) Both EYES two times a day  latanoprost 0.005% Ophthalmic Solution 1 Drop(s) Both EYES at bedtime  hydrALAZINE 25 milliGRAM(s) Oral two times a day  multivitamin/minerals 1 Tablet(s) Oral daily  ascorbic acid 500 milliGRAM(s) Oral daily  insulin glargine Injectable (LANTUS) 25 Unit(s) SubCutaneous at bedtime  insulin lispro (HumaLOG) corrective regimen sliding scale   SubCutaneous three times a day before meals  dextrose 5%. 1000 milliLiter(s) (50 mL/Hr) IV Continuous <Continuous>  dextrose 50% Injectable 12.5 Gram(s) IV Push once  dextrose 50% Injectable 25 Gram(s) IV Push once  dextrose 50% Injectable 25 Gram(s) IV Push once  ALBUTerol/ipratropium for Nebulization 3 milliLiter(s) Nebulizer every 6 hours  enoxaparin Injectable 40 milliGRAM(s) SubCutaneous every 24 hours  atorvastatin 40 milliGRAM(s) Oral at bedtime  guaiFENesin   Syrup  (Sugar-Free) 200 milliGRAM(s) Oral every 4 hours  pantoprazole    Tablet 40 milliGRAM(s) Oral before breakfast  methylPREDNISolone sodium succinate Injectable 40 milliGRAM(s) IV Push every 12 hours    MEDICATIONS  (PRN):  acetaminophen   Tablet 650 milliGRAM(s) Oral every 6 hours PRN For Temp greater than 38 C (100.4 F)  acetaminophen   Tablet. 650 milliGRAM(s) Oral every 6 hours PRN Moderate Pain (4 - 6)  magnesium hydroxide Suspension 15 milliLiter(s) Oral at bedtime PRN Constipation  bisacodyl Suppository 10 milliGRAM(s) Rectal daily PRN Constipation  dextrose Gel 1 Dose(s) Oral once PRN Blood Glucose LESS THAN 70 milliGRAM(s)/deciliter  glucagon  Injectable 1 milliGRAM(s) IntraMuscular once PRN Glucose LESS THAN 70 milligrams/deciliter  benzocaine 15 mG/menthol 3.6 mG Lozenge 1 Lozenge Oral every 6 hours PRN Sore Throat  ondansetron Injectable 4 milliGRAM(s) IV Push every 6 hours PRN Nausea and/or Vomiting  aluminum hydroxide/magnesium hydroxide/simethicone Suspension 30 milliLiter(s) Oral every 4 hours PRN Dyspepsia      lab: 08-27    138  |  99  |  69.0<H>  ----------------------------<  218<H>  4.0   |  25.0  |  1.90<H>    Ca    8.8      27 Aug 2017 07:49  Mg     3.4     08-26                            11.1   23.4  )-----------( 152      ( 26 Aug 2017 08:46 )             34.9       08-26    137  |  97<L>  |  71.0<H>  ----------------------------<  231<H>  4.5   |  26.0  |  2.00<H>    Ca    9.2      26 Aug 2017 08:46  Mg     3.4     08-26    TPro  6.9  /  Alb  4.0  /  TBili  0.3<L>  /  DBili  x   /  AST  26  /  ALT  20  /  AlkPhos  89  08-25      Vital Signs Last 24 Hrs  T(C): 36.6 (26 Aug 2017 23:30), Max: 36.9 (26 Aug 2017 15:45)  T(F): 97.9 (26 Aug 2017 23:30), Max: 98.4 (26 Aug 2017 15:45)  HR: 69 (26 Aug 2017 23:30) (60 - 80)  BP: 132/79 (26 Aug 2017 23:30) (131/76 - 134/74)  BP(mean): --  RR: 18 (26 Aug 2017 23:30) (18 - 18)  SpO2: 96% (26 Aug 2017 23:30) (96% - 97%)  T(C): 36.7 (26 Aug 2017 08:34), Max: 36.8 (25 Aug 2017 16:03)  T(F): 98.1 (26 Aug 2017 08:34), Max: 98.3 (25 Aug 2017 16:03)  HR: 60 (26 Aug 2017 11:46) (58 - 71)  BP: 169/79 (26 Aug 2017 08:34) (139/69 - 169/79)  BP(mean): --  ABP: --  ABP(mean): --  RR: 18 (26 Aug 2017 08:34) (18 - 18)  SpO2: 97% (26 Aug 2017 11:46) (94% - 98%)      I&O's Summary      PHYSICAL EXAM:      Constitutional: Acutely and chronically ill    Eyes: wnl    ENMT:    Neck: veins not full    Breasts:    Back:    Respiratory: crackles right base, clear left    Cardiovascular: no  gallop, rub    Gastrointestinal: soft not tender    Genitourinary: neg    Rectal:    Extremities: trace edema    Vascular:    Neurological: awake alert    Skin:    Lymph Nodes:    Musculoskeletal: joint  changes OA diffuse    Psychiatric:

## 2017-08-27 NOTE — PROGRESS NOTE ADULT - ASSESSMENT
89 yr old female resident of [facility] with diabetes mellitus, hypertension, hyperlipidemia, CHF, COPD admitted with complaints of exertional shortness of breath and wheezing. She was started on IV steroids for COPD exacerbation and Lasix for CHF exacerbation. CXR negative for infiltrate. Noted to have mild elevation on creatinine, which per family, has chronic problems with her kidneys. ECHO was done, which revealed improved EF and diastolic dysfunction. Her kidney function worsened. Renal was consulted. Her steroids were tapered.     > Gastritis - Escalated PPI.  Offered pt GI workup, which she wished to defer until tomorrow.  If symptoms do not improve, will d/w GI regarding further workup.    ... See below.

## 2017-08-28 DIAGNOSIS — K21.9 GASTRO-ESOPHAGEAL REFLUX DISEASE WITHOUT ESOPHAGITIS: ICD-10-CM

## 2017-08-28 LAB
ANA PAT FLD IF-IMP: ABNORMAL
ANA TITR SER: ABNORMAL
ANION GAP SERPL CALC-SCNC: 12 MMOL/L — SIGNIFICANT CHANGE UP (ref 5–17)
BUN SERPL-MCNC: 65 MG/DL — HIGH (ref 8–20)
CALCIUM SERPL-MCNC: 8.7 MG/DL — SIGNIFICANT CHANGE UP (ref 8.6–10.2)
CHLORIDE SERPL-SCNC: 99 MMOL/L — SIGNIFICANT CHANGE UP (ref 98–107)
CO2 SERPL-SCNC: 28 MMOL/L — SIGNIFICANT CHANGE UP (ref 22–29)
CREAT SERPL-MCNC: 1.98 MG/DL — HIGH (ref 0.5–1.3)
GLUCOSE SERPL-MCNC: 143 MG/DL — HIGH (ref 70–115)
HCT VFR BLD CALC: 38.4 % — SIGNIFICANT CHANGE UP (ref 37–47)
HGB BLD-MCNC: 11.8 G/DL — LOW (ref 12–16)
MCHC RBC-ENTMCNC: 28.9 PG — SIGNIFICANT CHANGE UP (ref 27–31)
MCHC RBC-ENTMCNC: 30.7 G/DL — LOW (ref 32–36)
MCV RBC AUTO: 93.9 FL — SIGNIFICANT CHANGE UP (ref 81–99)
PLATELET # BLD AUTO: 134 K/UL — LOW (ref 150–400)
POTASSIUM SERPL-MCNC: 4.1 MMOL/L — SIGNIFICANT CHANGE UP (ref 3.5–5.3)
POTASSIUM SERPL-SCNC: 4.1 MMOL/L — SIGNIFICANT CHANGE UP (ref 3.5–5.3)
RBC # BLD: 4.09 M/UL — LOW (ref 4.4–5.2)
RBC # FLD: 13.7 % — SIGNIFICANT CHANGE UP (ref 11–15.6)
SODIUM SERPL-SCNC: 139 MMOL/L — SIGNIFICANT CHANGE UP (ref 135–145)
WBC # BLD: 32.8 K/UL — HIGH (ref 4.8–10.8)
WBC # FLD AUTO: 32.8 K/UL — HIGH (ref 4.8–10.8)

## 2017-08-28 PROCEDURE — 99223 1ST HOSP IP/OBS HIGH 75: CPT

## 2017-08-28 PROCEDURE — 99233 SBSQ HOSP IP/OBS HIGH 50: CPT

## 2017-08-28 RX ORDER — SUCRALFATE 1 G
1 TABLET ORAL
Qty: 0 | Refills: 0 | Status: DISCONTINUED | OUTPATIENT
Start: 2017-08-28 | End: 2017-09-02

## 2017-08-28 RX ORDER — IPRATROPIUM/ALBUTEROL SULFATE 18-103MCG
3 AEROSOL WITH ADAPTER (GRAM) INHALATION EVERY 6 HOURS
Qty: 0 | Refills: 0 | Status: DISCONTINUED | OUTPATIENT
Start: 2017-08-28 | End: 2017-09-02

## 2017-08-28 RX ADMIN — LATANOPROST 1 DROP(S): 0.05 SOLUTION/ DROPS OPHTHALMIC; TOPICAL at 23:15

## 2017-08-28 RX ADMIN — DORZOLAMIDE HYDROCHLORIDE 1 DROP(S): 20 SOLUTION/ DROPS OPHTHALMIC at 06:24

## 2017-08-28 RX ADMIN — Medication 81 MILLIGRAM(S): at 11:56

## 2017-08-28 RX ADMIN — LORATADINE 10 MILLIGRAM(S): 10 TABLET ORAL at 11:51

## 2017-08-28 RX ADMIN — Medication 1 GRAM(S): at 14:54

## 2017-08-28 RX ADMIN — ATORVASTATIN CALCIUM 40 MILLIGRAM(S): 80 TABLET, FILM COATED ORAL at 23:14

## 2017-08-28 RX ADMIN — Medication 8: at 23:15

## 2017-08-28 RX ADMIN — Medication 1 GRAM(S): at 18:53

## 2017-08-28 RX ADMIN — Medication 500000 UNIT(S): at 23:14

## 2017-08-28 RX ADMIN — Medication 500 MILLIGRAM(S): at 11:51

## 2017-08-28 RX ADMIN — DORZOLAMIDE HYDROCHLORIDE 1 DROP(S): 20 SOLUTION/ DROPS OPHTHALMIC at 18:53

## 2017-08-28 RX ADMIN — INSULIN GLARGINE 25 UNIT(S): 100 INJECTION, SOLUTION SUBCUTANEOUS at 23:15

## 2017-08-28 RX ADMIN — PANTOPRAZOLE SODIUM 40 MILLIGRAM(S): 20 TABLET, DELAYED RELEASE ORAL at 18:52

## 2017-08-28 RX ADMIN — CLOPIDOGREL BISULFATE 75 MILLIGRAM(S): 75 TABLET, FILM COATED ORAL at 11:51

## 2017-08-28 RX ADMIN — Medication 1 GRAM(S): at 23:14

## 2017-08-28 RX ADMIN — Medication 25 MILLIGRAM(S): at 06:24

## 2017-08-28 RX ADMIN — Medication 500000 UNIT(S): at 06:24

## 2017-08-28 RX ADMIN — Medication 40 MILLIGRAM(S): at 11:58

## 2017-08-28 RX ADMIN — Medication 100 MILLIGRAM(S): at 06:24

## 2017-08-28 RX ADMIN — Medication 100 MILLIGRAM(S): at 18:53

## 2017-08-28 RX ADMIN — Medication 1 TABLET(S): at 11:51

## 2017-08-28 RX ADMIN — PANTOPRAZOLE SODIUM 40 MILLIGRAM(S): 20 TABLET, DELAYED RELEASE ORAL at 06:24

## 2017-08-28 RX ADMIN — Medication 40 MILLIGRAM(S): at 06:24

## 2017-08-28 RX ADMIN — Medication 25 MILLIGRAM(S): at 18:53

## 2017-08-28 RX ADMIN — Medication 4: at 11:50

## 2017-08-28 RX ADMIN — Medication 500000 UNIT(S): at 18:53

## 2017-08-28 RX ADMIN — Medication 500000 UNIT(S): at 11:56

## 2017-08-28 RX ADMIN — Medication 8: at 17:11

## 2017-08-28 RX ADMIN — ENOXAPARIN SODIUM 40 MILLIGRAM(S): 100 INJECTION SUBCUTANEOUS at 11:56

## 2017-08-28 NOTE — PROGRESS NOTE ADULT - PROBLEM SELECTOR PLAN 3
Continue Lantus, sliding scale coverage.  FS stable.  Would avoid escalating insulin as pt not reliably taking po at present.

## 2017-08-28 NOTE — PHYSICAL THERAPY INITIAL EVALUATION ADULT - PERTINENT HX OF CURRENT PROBLEM, REHAB EVAL
89 yr old female resident [facility] with diabetes mellitus, hypertension, hyperlipidemia, CHF, COPD, Second hand smoke exposure admitted with complaints of exertional shortness of breath and wheezing.

## 2017-08-28 NOTE — PROGRESS NOTE ADULT - SUBJECTIVE AND OBJECTIVE BOX
Patient seen and examined    Feels fine  no c/o CP, less SOB   No swelling feet      Vital Signs Last 24 Hrs  T(C): 36.8 (08-28-17 @ 15:25), Max: 36.8 (08-28-17 @ 15:25)  T(F): 98.3 (08-28-17 @ 15:25), Max: 98.3 (08-28-17 @ 15:25)  HR: 68 (08-28-17 @ 18:50) (58 - 85)  BP: 127/69 (08-28-17 @ 18:50) (125/74 - 137/80)  BP(mean): --  RR: 18 (08-28-17 @ 18:50) (18 - 19)  SpO2: 94% (08-28-17 @ 19:51) (94% - 100%)    Chest   clear ex few scatt rhonchi  CV   no murmur  Abd   soft, NT BS+  Extr   No edema  Neuro  grossly iintact motor      28 Aug 2017 09:03    139    |  99     |  65.0   ----------------------------<  143    4.1     |  28.0   |  1.98     Ca    8.7        28 Aug 2017 09:03                            11.8   32.8  )-----------( 134      ( 28 Aug 2017 09:03 )             38.4       Creat stable within range  WBC increased - cause ?  Last cxr clear' renal sono normal  Labs am

## 2017-08-28 NOTE — DIETITIAN INITIAL EVALUATION ADULT. - OTHER INFO
Pt seen at bedside. Pt states having good appetite and PO intake currently and PTA. Pt with HbA1C of 8.8. Pt does not follow a restricted diet at home. Educated on consistent CHO, DASH/TLC diet. Denies n/v/c/d. No difficulty chewing or swallowing foods.

## 2017-08-28 NOTE — PHYSICAL THERAPY INITIAL EVALUATION ADULT - ADDITIONAL COMMENTS
Pt. is a resident of [facility]. Pt. reports being modified independent PTA with a RW. Pt. reports walking hallways modified independent.

## 2017-08-28 NOTE — CONSULT NOTE ADULT - SUBJECTIVE AND OBJECTIVE BOX
Patient is a 89y old  Female who presents with a chief complaint of "pains in legs and thigh" (23 Aug 2017 00:26) referred for GI evaluation of severe acid reflux for the past seven days.      HPI:  89 yr old female resident of [facility] with diabetes mellitus, hypertension, hyperlipidemia, CHF, COPD admitted with complaints of few days of shortness of breath, worse on exertion. Yesterday, she had a cough with scant sputum. She has chronic leg swelling. No chest pain, dizziness, no palpitations. No fever, chills, nausea, vomiting, bowel/bladder complaints. She is unsure of sick contacts. (22 Aug 2017 16:25). GI evaluation requested because of severe acid reflux which began here in the hospital for the past seven days likely related to IV steroids given here for COPD exacerbation. She has had no previous EGD's and colonoscopies and has never suffered from acid reflux prior to seven days ago.      REVIEW OF SYSTEMS:  Constitutional: No fever, weight loss or fatigue  ENMT:  No difficulty hearing, tinnitus, vertigo; No sinus or throat pain  Respiratory: Positive cough, + wheezing, chills or hemoptysis, some SOB but better than when she was admitted.  Cardiovascular: No chest pain, palpitations, dizziness or leg swelling  Gastrointestinal: As per HPI. No abdominal or epigastric pain. No nausea, vomiting or hematemesis; No diarrhea or constipation. No melena or hematochezia.  Skin: No itching, burning, rashes or lesions   Musculoskeletal: No joint pain or swelling; No muscle, back or extremity pain  Patient has no cardiopulmonary, peripheral vascular, musculoskeletal, dermatological, neurological, gynecological or psychological symptoms or complaints at this time    PAST MEDICAL & SURGICAL HISTORY:  Congestive heart failure (CHF)  Hiatal hernia  Diabetes  Hypercholesteremia  Hypertension  Pacemaker  COPD (chronic obstructive pulmonary disease)  History of appendectomy  History of tonsillectomy  S/P PHOEBE (total abdominal hysterectomy)  Artificial pacemaker      FAMILY HISTORY:  No pertinent family history in first degree relatives      SOCIAL HISTORY:  Smoking Status: [ ] Current, [ ] Former, [ x] Never  Pack Years: N/A. No ETOH or drug abuse history.    MEDICATIONS:  MEDICATIONS  (STANDING):  aspirin  chewable 81 milliGRAM(s) Oral daily  loratadine 10 milliGRAM(s) Oral daily  clopidogrel Tablet 75 milliGRAM(s) Oral daily  metoprolol 100 milliGRAM(s) Oral two times a day  dorzolamide 2% Ophthalmic Solution 1 Drop(s) Both EYES two times a day  latanoprost 0.005% Ophthalmic Solution 1 Drop(s) Both EYES at bedtime  hydrALAZINE 25 milliGRAM(s) Oral two times a day  multivitamin/minerals 1 Tablet(s) Oral daily  ascorbic acid 500 milliGRAM(s) Oral daily  insulin glargine Injectable (LANTUS) 25 Unit(s) SubCutaneous at bedtime  dextrose 5%. 1000 milliLiter(s) (50 mL/Hr) IV Continuous <Continuous>  dextrose 50% Injectable 12.5 Gram(s) IV Push once  dextrose 50% Injectable 25 Gram(s) IV Push once  dextrose 50% Injectable 25 Gram(s) IV Push once  enoxaparin Injectable 40 milliGRAM(s) SubCutaneous every 24 hours  atorvastatin 40 milliGRAM(s) Oral at bedtime  guaiFENesin   Syrup  (Sugar-Free) 200 milliGRAM(s) Oral every 4 hours  methylPREDNISolone sodium succinate Injectable 40 milliGRAM(s) IV Push every 12 hours  nystatin    Suspension 038390 Unit(s) Oral four times a day  insulin lispro (HumaLOG) corrective regimen sliding scale   SubCutaneous Before meals and at bedtime  pantoprazole    Tablet 40 milliGRAM(s) Oral two times a day before meals    MEDICATIONS  (PRN):  acetaminophen   Tablet 650 milliGRAM(s) Oral every 6 hours PRN For Temp greater than 38 C (100.4 F)  acetaminophen   Tablet. 650 milliGRAM(s) Oral every 6 hours PRN Moderate Pain (4 - 6)  magnesium hydroxide Suspension 15 milliLiter(s) Oral at bedtime PRN Constipation  bisacodyl Suppository 10 milliGRAM(s) Rectal daily PRN Constipation  dextrose Gel 1 Dose(s) Oral once PRN Blood Glucose LESS THAN 70 milliGRAM(s)/deciliter  glucagon  Injectable 1 milliGRAM(s) IntraMuscular once PRN Glucose LESS THAN 70 milligrams/deciliter  benzocaine 15 mG/menthol 3.6 mG Lozenge 1 Lozenge Oral every 6 hours PRN Sore Throat  ondansetron Injectable 4 milliGRAM(s) IV Push every 6 hours PRN Nausea and/or Vomiting  aluminum hydroxide/magnesium hydroxide/simethicone Suspension 30 milliLiter(s) Oral every 4 hours PRN Dyspepsia  ALBUTerol/ipratropium for Nebulization 3 milliLiter(s) Nebulizer every 6 hours PRN Shortness of Breath and/or Wheezing      Allergies    No Known Drug Allergies  shellfish (Flushing (Skin); Hives)    Intolerances    codeine (Nausea)      Vital Signs Last 24 Hrs  T(C): 36.5 (28 Aug 2017 07:10), Max: 36.6 (27 Aug 2017 17:07)  T(F): 97.7 (28 Aug 2017 07:10), Max: 97.9 (27 Aug 2017 17:07)  HR: 58 (28 Aug 2017 10:19) (58 - 80)  BP: 125/83 (28 Aug 2017 07:10) (125/83 - 153/82)  BP(mean): --  RR: 19 (28 Aug 2017 07:10) (18 - 19)  SpO2: 97% (28 Aug 2017 10:19) (97% - 100%)        PHYSICAL EXAM:    General: Well developed; well nourished; in no acute distress  HEENT: MMM, conjunctiva pink and sclera anicteric.  Lungs: Decreased breath sounds bilaterally with scattered wheezing.  Cor: RRR S1.S2 only  Gastrointestinal: Soft, non-tender non-distended; Normal bowel sounds; No rebound or guarding or HSM.  JONATHAN: Not done. Not indicated at present and pt. was sitting in a chair when seen and examined.  Extremities: Normal range of motion, No clubbing, cyanosis or edema  Neurological: Alert and oriented x3  Skin: Warm and dry. No obvious rash      LABS:                        11.8   32.8  )-----------( 134      ( 28 Aug 2017 09:03 )             38.4     08-28    139  |  99  |  65.0<H>  ----------------------------<  143<H>  4.1   |  28.0  |  1.98<H>    Ca    8.7      28 Aug 2017 09:03            RADIOLOGY & ADDITIONAL STUDIES: Noted.

## 2017-08-28 NOTE — CONSULT NOTE ADULT - PROBLEM SELECTOR RECOMMENDATION 9
Pt. is not an optimal candidate for EGD presently given her respiratory status. Her GERD is acute onset and likely related to medications she is receiving for her COPD exacerbation, with IV steroids being the most likely culprit. Will add Carafate liquid 1 gram orally QID to her current BID Pantoprazole and observe for symptomatic improvement.

## 2017-08-28 NOTE — PROGRESS NOTE ADULT - SUBJECTIVE AND OBJECTIVE BOX
Patient: MAURICIO GONSALES [id number] 89y Female                 Internal Medicine Hospitalist Progress Note - Dr. Umang Badillo    Chief Complaint: Patient is a 89y old  Female who presents with a chief complaint of "pains in legs and thigh" (23 Aug 2017 00:26)    HPI:  89 yr old female resident of [facility] with diabetes mellitus, hypertension, hyperlipidemia, CHF, COPD, Second hand smoke exposure admitted with complaints of exertional shortness of breath and wheezing. She was started on IV steroids for COPD exacerbation and Lasix for CHF exacerbation. CXR negative for infiltrate. Noted to have mild elevation on creatinine, which per family, has chronic problems with her kidneys. ECHO was done, which revealed improved EF and diastolic dysfunction. Her kidney function worsened. Renal was consulted. Her steroids were tapered. Renal Ultrasound unremarkable.    SOB improving.  Seen with son and daughter in law at bedside.  C/o continued midepigastric burning sensation with po intake, radiates to mid chest.  Better when fasting.  Symptoms only began ~ 1-2 weeks ago.      ____________________PHYSICAL EXAM:  Vitals reviewed as indicated below  GENERAL:  NAD Alert and Oriented x 3   HEENT: NCAT  CARDIOVASCULAR:  S1, S2  LUNGS: coarse BS b/l  ABDOMEN:  soft, (-) tenderness, (-) distension, (+) bowel sounds, (-) guarding, (-) rebound (-) rigidity  EXTREMITIES:  no cyanosis / clubbing / edema.   ____________________    VITALS:  Vital Signs Last 24 Hrs  T(C): 36.5 (28 Aug 2017 07:10), Max: 36.6 (27 Aug 2017 17:07)  T(F): 97.7 (28 Aug 2017 07:10), Max: 97.9 (27 Aug 2017 17:07)  HR: 58 (28 Aug 2017 10:19) (58 - 80)  BP: 125/83 (28 Aug 2017 07:10) (125/83 - 153/82)  BP(mean): --  RR: 19 (28 Aug 2017 07:10) (18 - 19)  SpO2: 97% (28 Aug 2017 10:19) (97% - 100%) Daily     Daily   CAPILLARY BLOOD GLUCOSE  238 (28 Aug 2017 11:23)  125 (28 Aug 2017 07:53)  257 (27 Aug 2017 21:59)  253 (27 Aug 2017 16:59)  199 (27 Aug 2017 11:54)        I&O's Summary      LABS:                        11.8   32.8  )-----------( 134      ( 28 Aug 2017 09:03 )             38.4     08-28    139  |  99  |  65.0<H>  ----------------------------<  143<H>  4.1   |  28.0  |  1.98<H>    Ca    8.7      28 Aug 2017 09:03                  MEDICATIONS:  acetaminophen   Tablet 650 milliGRAM(s) Oral every 6 hours PRN  acetaminophen   Tablet. 650 milliGRAM(s) Oral every 6 hours PRN  aspirin  chewable 81 milliGRAM(s) Oral daily  magnesium hydroxide Suspension 15 milliLiter(s) Oral at bedtime PRN  loratadine 10 milliGRAM(s) Oral daily  clopidogrel Tablet 75 milliGRAM(s) Oral daily  metoprolol 100 milliGRAM(s) Oral two times a day  bisacodyl Suppository 10 milliGRAM(s) Rectal daily PRN  dorzolamide 2% Ophthalmic Solution 1 Drop(s) Both EYES two times a day  latanoprost 0.005% Ophthalmic Solution 1 Drop(s) Both EYES at bedtime  hydrALAZINE 25 milliGRAM(s) Oral two times a day  multivitamin/minerals 1 Tablet(s) Oral daily  ascorbic acid 500 milliGRAM(s) Oral daily  insulin glargine Injectable (LANTUS) 25 Unit(s) SubCutaneous at bedtime  dextrose 5%. 1000 milliLiter(s) IV Continuous <Continuous>  dextrose Gel 1 Dose(s) Oral once PRN  dextrose 50% Injectable 12.5 Gram(s) IV Push once  dextrose 50% Injectable 25 Gram(s) IV Push once  dextrose 50% Injectable 25 Gram(s) IV Push once  glucagon  Injectable 1 milliGRAM(s) IntraMuscular once PRN  enoxaparin Injectable 40 milliGRAM(s) SubCutaneous every 24 hours  atorvastatin 40 milliGRAM(s) Oral at bedtime  benzocaine 15 mG/menthol 3.6 mG Lozenge 1 Lozenge Oral every 6 hours PRN  guaiFENesin   Syrup  (Sugar-Free) 200 milliGRAM(s) Oral every 4 hours  ondansetron Injectable 4 milliGRAM(s) IV Push every 6 hours PRN  aluminum hydroxide/magnesium hydroxide/simethicone Suspension 30 milliLiter(s) Oral every 4 hours PRN  methylPREDNISolone sodium succinate Injectable 40 milliGRAM(s) IV Push every 12 hours  nystatin    Suspension 548012 Unit(s) Oral four times a day  insulin lispro (HumaLOG) corrective regimen sliding scale   SubCutaneous Before meals and at bedtime  pantoprazole    Tablet 40 milliGRAM(s) Oral two times a day before meals  ALBUTerol/ipratropium for Nebulization 3 milliLiter(s) Nebulizer every 6 hours PRN  sucralfate suspension 1 Gram(s) Oral four times a day

## 2017-08-28 NOTE — DIETITIAN INITIAL EVALUATION ADULT. - PERTINENT MEDS FT
Protonix, HumaLOG Sliding Scale, Solu-Medrol, Vit C, Lipitor, Dulcolax, Apresoline, Lantus, Lopressor, MVI

## 2017-08-29 LAB
ANION GAP SERPL CALC-SCNC: 14 MMOL/L — SIGNIFICANT CHANGE UP (ref 5–17)
BUN SERPL-MCNC: 68 MG/DL — HIGH (ref 8–20)
CALCIUM SERPL-MCNC: 8.8 MG/DL — SIGNIFICANT CHANGE UP (ref 8.6–10.2)
CHLORIDE SERPL-SCNC: 101 MMOL/L — SIGNIFICANT CHANGE UP (ref 98–107)
CO2 SERPL-SCNC: 24 MMOL/L — SIGNIFICANT CHANGE UP (ref 22–29)
CREAT SERPL-MCNC: 1.91 MG/DL — HIGH (ref 0.5–1.3)
GLUCOSE SERPL-MCNC: 149 MG/DL — HIGH (ref 70–115)
HCT VFR BLD CALC: 34.7 % — LOW (ref 37–47)
HGB BLD-MCNC: 11.2 G/DL — LOW (ref 12–16)
MCHC RBC-ENTMCNC: 29.3 PG — SIGNIFICANT CHANGE UP (ref 27–31)
MCHC RBC-ENTMCNC: 32.3 G/DL — SIGNIFICANT CHANGE UP (ref 32–36)
MCV RBC AUTO: 90.8 FL — SIGNIFICANT CHANGE UP (ref 81–99)
PLATELET # BLD AUTO: 103 K/UL — LOW (ref 150–400)
POTASSIUM SERPL-MCNC: 3.7 MMOL/L — SIGNIFICANT CHANGE UP (ref 3.5–5.3)
POTASSIUM SERPL-SCNC: 3.7 MMOL/L — SIGNIFICANT CHANGE UP (ref 3.5–5.3)
RBC # BLD: 3.82 M/UL — LOW (ref 4.4–5.2)
RBC # FLD: 13.9 % — SIGNIFICANT CHANGE UP (ref 11–15.6)
SODIUM SERPL-SCNC: 139 MMOL/L — SIGNIFICANT CHANGE UP (ref 135–145)
WBC # BLD: 24.9 K/UL — HIGH (ref 4.8–10.8)
WBC # FLD AUTO: 24.9 K/UL — HIGH (ref 4.8–10.8)

## 2017-08-29 PROCEDURE — 99232 SBSQ HOSP IP/OBS MODERATE 35: CPT

## 2017-08-29 PROCEDURE — 99233 SBSQ HOSP IP/OBS HIGH 50: CPT

## 2017-08-29 PROCEDURE — 74176 CT ABD & PELVIS W/O CONTRAST: CPT | Mod: 26

## 2017-08-29 RX ADMIN — Medication 81 MILLIGRAM(S): at 15:53

## 2017-08-29 RX ADMIN — PANTOPRAZOLE SODIUM 40 MILLIGRAM(S): 20 TABLET, DELAYED RELEASE ORAL at 05:24

## 2017-08-29 RX ADMIN — PANTOPRAZOLE SODIUM 40 MILLIGRAM(S): 20 TABLET, DELAYED RELEASE ORAL at 15:58

## 2017-08-29 RX ADMIN — Medication 500000 UNIT(S): at 05:17

## 2017-08-29 RX ADMIN — Medication 6: at 22:35

## 2017-08-29 RX ADMIN — LORATADINE 10 MILLIGRAM(S): 10 TABLET ORAL at 17:40

## 2017-08-29 RX ADMIN — Medication 25 MILLIGRAM(S): at 17:42

## 2017-08-29 RX ADMIN — Medication 650 MILLIGRAM(S): at 16:30

## 2017-08-29 RX ADMIN — ATORVASTATIN CALCIUM 40 MILLIGRAM(S): 80 TABLET, FILM COATED ORAL at 22:36

## 2017-08-29 RX ADMIN — DORZOLAMIDE HYDROCHLORIDE 1 DROP(S): 20 SOLUTION/ DROPS OPHTHALMIC at 05:16

## 2017-08-29 RX ADMIN — Medication 1 GRAM(S): at 05:16

## 2017-08-29 RX ADMIN — Medication 100 MILLIGRAM(S): at 17:39

## 2017-08-29 RX ADMIN — Medication 650 MILLIGRAM(S): at 15:55

## 2017-08-29 RX ADMIN — Medication 1 GRAM(S): at 17:42

## 2017-08-29 RX ADMIN — Medication 500 MILLIGRAM(S): at 15:53

## 2017-08-29 RX ADMIN — CLOPIDOGREL BISULFATE 75 MILLIGRAM(S): 75 TABLET, FILM COATED ORAL at 15:53

## 2017-08-29 RX ADMIN — Medication 6: at 17:39

## 2017-08-29 RX ADMIN — LATANOPROST 1 DROP(S): 0.05 SOLUTION/ DROPS OPHTHALMIC; TOPICAL at 22:35

## 2017-08-29 RX ADMIN — Medication 40 MILLIGRAM(S): at 05:17

## 2017-08-29 RX ADMIN — INSULIN GLARGINE 25 UNIT(S): 100 INJECTION, SOLUTION SUBCUTANEOUS at 22:36

## 2017-08-29 RX ADMIN — Medication 25 MILLIGRAM(S): at 05:17

## 2017-08-29 RX ADMIN — DORZOLAMIDE HYDROCHLORIDE 1 DROP(S): 20 SOLUTION/ DROPS OPHTHALMIC at 17:38

## 2017-08-29 RX ADMIN — Medication 1 TABLET(S): at 17:41

## 2017-08-29 RX ADMIN — Medication 500000 UNIT(S): at 17:40

## 2017-08-29 NOTE — PROGRESS NOTE ADULT - SUBJECTIVE AND OBJECTIVE BOX
Patient seen and examined,  Not much clinical improvement.  Still with heartburn and odynophagia.  No vomiting.  Some nausea.  Eating poorly.  Started yesterday on Carafate in addition to the BID PPI.  No BM as eating very little.      PAST MEDICAL & SURGICAL HISTORY:  Congestive heart failure (CHF)  Hiatal hernia  Diabetes  Hypercholesteremia  Hypertension  Pacemaker  COPD (chronic obstructive pulmonary disease)  History of appendectomy  History of tonsillectomy  S/P PHOEBE (total abdominal hysterectomy)  Artificial pacemaker      ROS:  No Heartburn, regurgitation, dysphagia, odynophagia.  No dyspepsia  No abdominal pain.    No Nausea, vomiting.  No Bleeding.  No hematemesis.   No diarrhea.    No hematochesia.  No weight loss, anorexia.  No edema.      MEDICATIONS  (STANDING):  aspirin  chewable 81 milliGRAM(s) Oral daily  loratadine 10 milliGRAM(s) Oral daily  clopidogrel Tablet 75 milliGRAM(s) Oral daily  metoprolol 100 milliGRAM(s) Oral two times a day  dorzolamide 2% Ophthalmic Solution 1 Drop(s) Both EYES two times a day  latanoprost 0.005% Ophthalmic Solution 1 Drop(s) Both EYES at bedtime  hydrALAZINE 25 milliGRAM(s) Oral two times a day  multivitamin/minerals 1 Tablet(s) Oral daily  ascorbic acid 500 milliGRAM(s) Oral daily  insulin glargine Injectable (LANTUS) 25 Unit(s) SubCutaneous at bedtime  dextrose 5%. 1000 milliLiter(s) (50 mL/Hr) IV Continuous <Continuous>  dextrose 50% Injectable 12.5 Gram(s) IV Push once  dextrose 50% Injectable 25 Gram(s) IV Push once  dextrose 50% Injectable 25 Gram(s) IV Push once  enoxaparin Injectable 40 milliGRAM(s) SubCutaneous every 24 hours  atorvastatin 40 milliGRAM(s) Oral at bedtime  guaiFENesin   Syrup  (Sugar-Free) 200 milliGRAM(s) Oral every 4 hours  nystatin    Suspension [id number] Unit(s) Oral four times a day  insulin lispro (HumaLOG) corrective regimen sliding scale   SubCutaneous Before meals and at bedtime  pantoprazole    Tablet 40 milliGRAM(s) Oral two times a day before meals  sucralfate suspension 1 Gram(s) Oral four times a day  predniSONE   Tablet 40 milliGRAM(s) Oral daily    MEDICATIONS  (PRN):  acetaminophen   Tablet 650 milliGRAM(s) Oral every 6 hours PRN For Temp greater than 38 C (100.4 F)  acetaminophen   Tablet. 650 milliGRAM(s) Oral every 6 hours PRN Moderate Pain (4 - 6)  magnesium hydroxide Suspension 15 milliLiter(s) Oral at bedtime PRN Constipation  bisacodyl Suppository 10 milliGRAM(s) Rectal daily PRN Constipation  dextrose Gel 1 Dose(s) Oral once PRN Blood Glucose LESS THAN 70 milliGRAM(s)/deciliter  glucagon  Injectable 1 milliGRAM(s) IntraMuscular once PRN Glucose LESS THAN 70 milligrams/deciliter  benzocaine 15 mG/menthol 3.6 mG Lozenge 1 Lozenge Oral every 6 hours PRN Sore Throat  ondansetron Injectable 4 milliGRAM(s) IV Push every 6 hours PRN Nausea and/or Vomiting  aluminum hydroxide/magnesium hydroxide/simethicone Suspension 30 milliLiter(s) Oral every 4 hours PRN Dyspepsia  ALBUTerol/ipratropium for Nebulization 3 milliLiter(s) Nebulizer every 6 hours PRN Shortness of Breath and/or Wheezing      Allergies    No Known Drug Allergies  shellfish (Flushing (Skin); Hives)    Intolerances    codeine (Nausea)      Vital Signs Last 24 Hrs  T(C): 37 (29 Aug 2017 17:53), Max: 37 (29 Aug 2017 17:53)  T(F): 98.6 (29 Aug 2017 17:53), Max: 98.6 (29 Aug 2017 17:53)  HR: 79 (29 Aug 2017 17:53) (33 - 79)  BP: 130/70 (29 Aug 2017 17:53) (127/69 - 153/81)  BP(mean): --  RR: 18 (29 Aug 2017 17:53) (18 - 20)  SpO2: 98% (29 Aug 2017 17:53) (94% - 98%)    PHYSICAL EXAM:    GENERAL: NAD, well-groomed, well-developed  HEAD:  Atraumatic, Normocephalic  EYES: EOMI, PERRLA, conjunctiva and sclera clear  ENMT: No tonsillar erythema, exudates, or enlargement; Moist mucous membranes, Good dentition, No lesions  NECK: Supple, No JVD, Normal thyroid  CHEST/LUNG: Clear to percussion bilaterally; No rales, rhonchi, wheezing, or rubs  HEART: Regular rate and rhythm; No murmurs, rubs, or gallops  ABDOMEN: Soft, Nontender, Nondistended; Bowel sounds present  EXTREMITIES:  2+ Peripheral Pulses, No clubbing, cyanosis, or edema  LYMPH: No lymphadenopathy noted  SKIN: No rashes or lesions      LABS:                        11.2   24.9  )-----------( 103      ( 29 Aug 2017 07:55 )             34.7     08-29    139  |  101  |  68.0<H>  ----------------------------<  149<H>  3.7   |  24.0  |  1.91<H>    Ca    8.8      29 Aug 2017 07:55               RADIOLOGY & ADDITIONAL STUDIES:

## 2017-08-29 NOTE — PROGRESS NOTE ADULT - SUBJECTIVE AND OBJECTIVE BOX
Patient seen and examined    Feels fine ex continued heartburn  denies abd pain  no c/o CP SOB NVD  No swelling feet    Vital Signs Last 24 Hrs  T(C): 37 (08-29-17 @ 17:53), Max: 37 (08-29-17 @ 17:53)  T(F): 98.6 (08-29-17 @ 17:53), Max: 98.6 (08-29-17 @ 17:53)  HR: 79 (08-29-17 @ 17:53) (33 - 79)  BP: 130/70 (08-29-17 @ 17:53) (129/68 - 153/81)  BP(mean): --  RR: 18 (08-29-17 @ 17:53) (18 - 20)  SpO2: 98% (08-29-17 @ 17:53) (95% - 98%)    Chest   clear  CV   no murmur  Abd   soft, NT BS+; Epi - NT  Extr   No edema  Neuro  grossly iintact motor        29 Aug 2017 07:55    139    |  101    |  68.0   ----------------------------<  149    3.7     |  24.0   |  1.91     Ca    8.8        29 Aug 2017 07:55                            11.2   24.9  )-----------( 103      ( 29 Aug 2017 07:55 )             34.7     CT Abd noted - ? gastritis + GB mass and Panc mass sugg of neoplasm and possible lung mets    Followed by GI, await recommendations  Creat stable

## 2017-08-29 NOTE — PROGRESS NOTE ADULT - SUBJECTIVE AND OBJECTIVE BOX
Patient: MAURICIO GONSALES [id number] 89y Female                 Internal Medicine Hospitalist Progress Note - Dr. Umang Badillo    Chief Complaint: Patient is a 89y old  Female who presents with a chief complaint of "pains in legs and thigh" (23 Aug 2017 00:26)    HPI:  89 yr old female resident [facility] with diabetes mellitus, hypertension, hyperlipidemia, CHF, COPD, Second hand smoke exposure admitted with complaints of exertional shortness of breath and wheezing. She was started on IV steroids for COPD exacerbation and Lasix for CHF exacerbation. CXR negative for infiltrate. Noted to have mild elevation on creatinine, which per family, has chronic problems with her kidneys. ECHO was done, which revealed improved EF and diastolic dysfunction. Her renal function worsened. Renal was consulted. Her steroids were tapered. Renal Ultrasound unremarkable.  She complained of midepigastric pain, burning sensation, which began during her hospitalization, worse with po intake.  Attributed to GERD / Gastritis.  Seen by GI, started on PPI and carafate.  CT A/P was ordered.  She has been unable to tolerate po due to pain.    SOB improved.  No cough.  C/o persistent midepigastric pain.      ____________________PHYSICAL EXAM:  Vitals reviewed as indicated below  GENERAL:  NAD Alert and Oriented x 3   HEENT: NCAT  CARDIOVASCULAR:  S1, S2  LUNGS: coarse BS b/l  ABDOMEN:  soft, (-) tenderness, (-) distension, (+) bowel sounds, (-) guarding, (-) rebound (-) rigidity  EXTREMITIES:  no cyanosis / clubbing / edema.   ____________________    VITALS:  Vital Signs Last 24 Hrs  T(C): 36.8 (29 Aug 2017 07:25), Max: 36.8 (28 Aug 2017 15:25)  T(F): 98.2 (29 Aug 2017 07:25), Max: 98.3 (28 Aug 2017 15:25)  HR: 61 (29 Aug 2017 07:25) (33 - 85)  BP: 140/73 (29 Aug 2017 07:25) (125/74 - 153/81)  BP(mean): --  RR: 19 (29 Aug 2017 07:25) (18 - 20)  SpO2: 98% (29 Aug 2017 07:25) (94% - 98%) Daily     Daily   CAPILLARY BLOOD GLUCOSE  139 (29 Aug 2017 08:33)  311 (28 Aug 2017 23:13)  310 (28 Aug 2017 16:58)        I&O's Summary      LABS:                        11.2   24.9  )-----------( 103      ( 29 Aug 2017 07:55 )             34.7     08-29    139  |  101  |  68.0<H>  ----------------------------<  149<H>  3.7   |  24.0  |  1.91<H>    Ca    8.8      29 Aug 2017 07:55                  MEDICATIONS:  acetaminophen   Tablet 650 milliGRAM(s) Oral every 6 hours PRN  acetaminophen   Tablet. 650 milliGRAM(s) Oral every 6 hours PRN  aspirin  chewable 81 milliGRAM(s) Oral daily  magnesium hydroxide Suspension 15 milliLiter(s) Oral at bedtime PRN  loratadine 10 milliGRAM(s) Oral daily  clopidogrel Tablet 75 milliGRAM(s) Oral daily  metoprolol 100 milliGRAM(s) Oral two times a day  bisacodyl Suppository 10 milliGRAM(s) Rectal daily PRN  dorzolamide 2% Ophthalmic Solution 1 Drop(s) Both EYES two times a day  latanoprost 0.005% Ophthalmic Solution 1 Drop(s) Both EYES at bedtime  hydrALAZINE 25 milliGRAM(s) Oral two times a day  multivitamin/minerals 1 Tablet(s) Oral daily  ascorbic acid 500 milliGRAM(s) Oral daily  insulin glargine Injectable (LANTUS) 25 Unit(s) SubCutaneous at bedtime  dextrose 5%. 1000 milliLiter(s) IV Continuous <Continuous>  dextrose Gel 1 Dose(s) Oral once PRN  dextrose 50% Injectable 12.5 Gram(s) IV Push once  dextrose 50% Injectable 25 Gram(s) IV Push once  dextrose 50% Injectable 25 Gram(s) IV Push once  glucagon  Injectable 1 milliGRAM(s) IntraMuscular once PRN  enoxaparin Injectable 40 milliGRAM(s) SubCutaneous every 24 hours  atorvastatin 40 milliGRAM(s) Oral at bedtime  benzocaine 15 mG/menthol 3.6 mG Lozenge 1 Lozenge Oral every 6 hours PRN  guaiFENesin   Syrup  (Sugar-Free) 200 milliGRAM(s) Oral every 4 hours  ondansetron Injectable 4 milliGRAM(s) IV Push every 6 hours PRN  aluminum hydroxide/magnesium hydroxide/simethicone Suspension 30 milliLiter(s) Oral every 4 hours PRN  nystatin    Suspension [id number] Unit(s) Oral four times a day  insulin lispro (HumaLOG) corrective regimen sliding scale   SubCutaneous Before meals and at bedtime  pantoprazole    Tablet 40 milliGRAM(s) Oral two times a day before meals  ALBUTerol/ipratropium for Nebulization 3 milliLiter(s) Nebulizer every 6 hours PRN  sucralfate suspension 1 Gram(s) Oral four times a day  predniSONE   Tablet 40 milliGRAM(s) Oral daily

## 2017-08-29 NOTE — PROGRESS NOTE ADULT - ASSESSMENT
89 yr old female resident of [facility] with diabetes mellitus, hypertension, hyperlipidemia, CHF, COPD, Second hand smoke exposure admitted with complaints of exertional shortness of breath and wheezing. She was started on IV steroids for COPD exacerbation and Lasix for CHF exacerbation. CXR negative for infiltrate. Noted to have mild elevation on creatinine, which per family, has chronic problems with her kidneys. ECHO was done, which revealed improved EF and diastolic dysfunction. Her renal function worsened. Renal was consulted. Her steroids were tapered. Renal Ultrasound unremarkable.  She complained of midepigastric pain, burning sensation, which began during her hospitalization, worse with po intake.  Attributed to GERD / Gastritis.  Seen by GI, started on PPI and carafate.  CT A/P was ordered.  She has been unable to tolerate po due to pain.    > Gastritis / GERD - GI consulted and discussed with Dr. Robbins.  Added Carafate.  Continue PPI.  No further plans for intervention for now. CT A/P pending.     ... See below

## 2017-08-30 LAB
ANION GAP SERPL CALC-SCNC: 15 MMOL/L — SIGNIFICANT CHANGE UP (ref 5–17)
BUN SERPL-MCNC: 64 MG/DL — HIGH (ref 8–20)
CALCIUM SERPL-MCNC: 8.4 MG/DL — LOW (ref 8.6–10.2)
CHLORIDE SERPL-SCNC: 100 MMOL/L — SIGNIFICANT CHANGE UP (ref 98–107)
CO2 SERPL-SCNC: 25 MMOL/L — SIGNIFICANT CHANGE UP (ref 22–29)
CREAT SERPL-MCNC: 1.9 MG/DL — HIGH (ref 0.5–1.3)
GLUCOSE SERPL-MCNC: 60 MG/DL — LOW (ref 70–115)
HCT VFR BLD CALC: 34.1 % — LOW (ref 37–47)
HGB BLD-MCNC: 11 G/DL — LOW (ref 12–16)
MCHC RBC-ENTMCNC: 29.3 PG — SIGNIFICANT CHANGE UP (ref 27–31)
MCHC RBC-ENTMCNC: 32.3 G/DL — SIGNIFICANT CHANGE UP (ref 32–36)
MCV RBC AUTO: 90.7 FL — SIGNIFICANT CHANGE UP (ref 81–99)
PLATELET # BLD AUTO: 96 K/UL — LOW (ref 150–400)
POTASSIUM SERPL-MCNC: 3.6 MMOL/L — SIGNIFICANT CHANGE UP (ref 3.5–5.3)
POTASSIUM SERPL-SCNC: 3.6 MMOL/L — SIGNIFICANT CHANGE UP (ref 3.5–5.3)
RBC # BLD: 3.76 M/UL — LOW (ref 4.4–5.2)
RBC # FLD: 13.9 % — SIGNIFICANT CHANGE UP (ref 11–15.6)
SODIUM SERPL-SCNC: 140 MMOL/L — SIGNIFICANT CHANGE UP (ref 135–145)
WBC # BLD: 23.6 K/UL — HIGH (ref 4.8–10.8)
WBC # FLD AUTO: 23.6 K/UL — HIGH (ref 4.8–10.8)

## 2017-08-30 PROCEDURE — 99232 SBSQ HOSP IP/OBS MODERATE 35: CPT

## 2017-08-30 PROCEDURE — 99233 SBSQ HOSP IP/OBS HIGH 50: CPT

## 2017-08-30 RX ADMIN — Medication 1 GRAM(S): at 05:31

## 2017-08-30 RX ADMIN — ENOXAPARIN SODIUM 40 MILLIGRAM(S): 100 INJECTION SUBCUTANEOUS at 11:29

## 2017-08-30 RX ADMIN — Medication 100 MILLIGRAM(S): at 17:28

## 2017-08-30 RX ADMIN — Medication 6: at 22:02

## 2017-08-30 RX ADMIN — ATORVASTATIN CALCIUM 40 MILLIGRAM(S): 80 TABLET, FILM COATED ORAL at 22:03

## 2017-08-30 RX ADMIN — Medication 25 MILLIGRAM(S): at 05:32

## 2017-08-30 RX ADMIN — Medication 30 MILLILITER(S): at 11:31

## 2017-08-30 RX ADMIN — PANTOPRAZOLE SODIUM 40 MILLIGRAM(S): 20 TABLET, DELAYED RELEASE ORAL at 05:32

## 2017-08-30 RX ADMIN — Medication 1 GRAM(S): at 22:02

## 2017-08-30 RX ADMIN — DORZOLAMIDE HYDROCHLORIDE 1 DROP(S): 20 SOLUTION/ DROPS OPHTHALMIC at 05:31

## 2017-08-30 RX ADMIN — Medication 1 TABLET(S): at 14:05

## 2017-08-30 RX ADMIN — Medication 81 MILLIGRAM(S): at 11:28

## 2017-08-30 RX ADMIN — Medication 25 MILLIGRAM(S): at 17:28

## 2017-08-30 RX ADMIN — Medication 1 GRAM(S): at 01:07

## 2017-08-30 RX ADMIN — PANTOPRAZOLE SODIUM 40 MILLIGRAM(S): 20 TABLET, DELAYED RELEASE ORAL at 16:02

## 2017-08-30 RX ADMIN — Medication 2: at 10:49

## 2017-08-30 RX ADMIN — Medication 500 MILLIGRAM(S): at 11:28

## 2017-08-30 RX ADMIN — CLOPIDOGREL BISULFATE 75 MILLIGRAM(S): 75 TABLET, FILM COATED ORAL at 11:28

## 2017-08-30 RX ADMIN — Medication 500000 UNIT(S): at 11:29

## 2017-08-30 RX ADMIN — DORZOLAMIDE HYDROCHLORIDE 1 DROP(S): 20 SOLUTION/ DROPS OPHTHALMIC at 17:28

## 2017-08-30 RX ADMIN — Medication 500000 UNIT(S): at 22:02

## 2017-08-30 RX ADMIN — INSULIN GLARGINE 25 UNIT(S): 100 INJECTION, SOLUTION SUBCUTANEOUS at 22:02

## 2017-08-30 RX ADMIN — LORATADINE 10 MILLIGRAM(S): 10 TABLET ORAL at 14:04

## 2017-08-30 RX ADMIN — LATANOPROST 1 DROP(S): 0.05 SOLUTION/ DROPS OPHTHALMIC; TOPICAL at 22:03

## 2017-08-30 RX ADMIN — Medication 1 GRAM(S): at 17:27

## 2017-08-30 RX ADMIN — Medication 500000 UNIT(S): at 05:31

## 2017-08-30 RX ADMIN — Medication 1 GRAM(S): at 11:29

## 2017-08-30 RX ADMIN — Medication 8: at 16:03

## 2017-08-30 RX ADMIN — Medication 40 MILLIGRAM(S): at 05:32

## 2017-08-30 RX ADMIN — Medication 500000 UNIT(S): at 17:27

## 2017-08-30 RX ADMIN — Medication 500000 UNIT(S): at 01:07

## 2017-08-30 NOTE — PROGRESS NOTE ADULT - SUBJECTIVE AND OBJECTIVE BOX
Chief Complaint: Patient is a 89y old  Female who presents with a chief complaint of "pains in legs and thigh" (23 Aug 2017 00:26)    HPI: 89 year old female resident [facility] with diabetes mellitus, hypertension, hyperlipidemia, CHF, COPD, Second hand smoke exposure admitted with complaints of exertional shortness of breath and wheezing. She was started on IV steroids for COPD exacerbation and Lasix for CHF exacerbation. CXR negative for infiltrate. Noted to have mild elevation on creatinine, which per family, has chronic problems with her kidneys. ECHO was done, which revealed improved EF and diastolic dysfunction. Her renal function worsened. Renal was consulted. Her steroids were tapered. Renal Ultrasound unremarkable.  She complained of midepigastric pain, burning sensation, which began during her hospitalization, worse with po intake.  Attributed to GERD / Gastritis.  Seen by GI, started on PPI and carafate.  CT A/P was ordered.  She has been unable to tolerate po due to pain.    SOB improved.  No cough.  C/o persistent midepigastric pain.    Other ROS reviewed and neg     ____________________PHYSICAL EXAM:  Vitals reviewed as indicated below  GENERAL:  NAD Alert and Oriented x 3   HEENT: NCAT  CARDIOVASCULAR:  S1, S2, regular  LUNGS: coarse BS b/l,  minimal crackles bibasilarly  ABDOMEN:  soft, (-) tenderness, (-) distension, (+) bowel sounds, (-) guarding, (-) rebound (-) rigidity  EXTREMITIES:  no cyanosis / clubbing / edema.   NEURO: CN II-XII grossly intact, DTR + 1 BL  ____________________    Vital Signs Last 24 Hrs  T(C): 36.7 (30 Aug 2017 07:30), Max: 37 (29 Aug 2017 17:53)  T(F): 98 (30 Aug 2017 07:30), Max: 98.6 (29 Aug 2017 17:53)  HR: 67 (30 Aug 2017 07:30) (53 - 79)  BP: 132/78 (30 Aug 2017 07:30) (118/71 - 142/67)  RR: 18 (30 Aug 2017 07:30) (18 - 21)  SpO2: 98% (30 Aug 2017 07:30) (21% - 98%)                       11.0   23.6  )-----------( 96       ( 30 Aug 2017 07:54 )             34.1     30 Aug 2017 07:54    140    |  100    |  64.0   ----------------------------<  60     3.6     |  25.0   |  1.90     Ca    8.4        30 Aug 2017 07:54    CAPILLARY BLOOD GLUCOSE  189 (30 Aug 2017 10:47)  70 (30 Aug 2017 08:07)  273 (29 Aug 2017 22:33)  265 (29 Aug 2017 16:01)    MEDICATIONS  (STANDING):  aspirin  chewable 81 milliGRAM(s) Oral daily  loratadine 10 milliGRAM(s) Oral daily  clopidogrel Tablet 75 milliGRAM(s) Oral daily  metoprolol 100 milliGRAM(s) Oral two times a day  dorzolamide 2% Ophthalmic Solution 1 Drop(s) Both EYES two times a day  latanoprost 0.005% Ophthalmic Solution 1 Drop(s) Both EYES at bedtime  hydrALAZINE 25 milliGRAM(s) Oral two times a day  multivitamin/minerals 1 Tablet(s) Oral daily  ascorbic acid 500 milliGRAM(s) Oral daily  insulin glargine Injectable (LANTUS) 25 Unit(s) SubCutaneous at bedtime  dextrose 5%. 1000 milliLiter(s) (50 mL/Hr) IV Continuous <Continuous>  dextrose 50% Injectable 12.5 Gram(s) IV Push once  dextrose 50% Injectable 25 Gram(s) IV Push once  dextrose 50% Injectable 25 Gram(s) IV Push once  enoxaparin Injectable 40 milliGRAM(s) SubCutaneous every 24 hours  atorvastatin 40 milliGRAM(s) Oral at bedtime  guaiFENesin   Syrup  (Sugar-Free) 200 milliGRAM(s) Oral every 4 hours  nystatin    Suspension 790424 Unit(s) Oral four times a day  insulin lispro (HumaLOG) corrective regimen sliding scale   SubCutaneous Before meals and at bedtime  pantoprazole    Tablet 40 milliGRAM(s) Oral two times a day before meals  sucralfate suspension 1 Gram(s) Oral four times a day  predniSONE   Tablet 40 milliGRAM(s) Oral daily    MEDICATIONS  (PRN):  acetaminophen   Tablet 650 milliGRAM(s) Oral every 6 hours PRN For Temp greater than 38 C (100.4 F)  acetaminophen   Tablet. 650 milliGRAM(s) Oral every 6 hours PRN Moderate Pain (4 - 6)  magnesium hydroxide Suspension 15 milliLiter(s) Oral at bedtime PRN Constipation  bisacodyl Suppository 10 milliGRAM(s) Rectal daily PRN Constipation  dextrose Gel 1 Dose(s) Oral once PRN Blood Glucose LESS THAN 70 milliGRAM(s)/deciliter  glucagon  Injectable 1 milliGRAM(s) IntraMuscular once PRN Glucose LESS THAN 70 milligrams/deciliter  benzocaine 15 mG/menthol 3.6 mG Lozenge 1 Lozenge Oral every 6 hours PRN Sore Throat  ondansetron Injectable 4 milliGRAM(s) IV Push every 6 hours PRN Nausea and/or Vomiting  aluminum hydroxide/magnesium hydroxide/simethicone Suspension 30 milliLiter(s) Oral every 4 hours PRN Dyspepsia  ALBUTerol/ipratropium for Nebulization 3 milliLiter(s) Nebulizer every 6 hours PRN Shortness of Breath and/or Wheezing

## 2017-08-30 NOTE — PROGRESS NOTE ADULT - PROBLEM SELECTOR PLAN 1
Improved, likely secondary to steroids used for COPD exacerbation. Her reflux will likely continue to improve with steroid tapering. Would continue current Pantoprazole and Carafate therapies until she has been completely weaned off of steroids. No plans for EGD and no need for continued GI f/u. Signing off. Reconsult as needed. Thank you.

## 2017-08-30 NOTE — PROGRESS NOTE ADULT - SUBJECTIVE AND OBJECTIVE BOX
NEPHROLOGY INTERVAL HPI/OVERNIGHT EVENTS:    Examined earlier  Looks comfortable    MEDICATIONS  (STANDING):  aspirin  chewable 81 milliGRAM(s) Oral daily  loratadine 10 milliGRAM(s) Oral daily  clopidogrel Tablet 75 milliGRAM(s) Oral daily  metoprolol 100 milliGRAM(s) Oral two times a day  dorzolamide 2% Ophthalmic Solution 1 Drop(s) Both EYES two times a day  latanoprost 0.005% Ophthalmic Solution 1 Drop(s) Both EYES at bedtime  hydrALAZINE 25 milliGRAM(s) Oral two times a day  multivitamin/minerals 1 Tablet(s) Oral daily  ascorbic acid 500 milliGRAM(s) Oral daily  insulin glargine Injectable (LANTUS) 25 Unit(s) SubCutaneous at bedtime  dextrose 5%. 1000 milliLiter(s) (50 mL/Hr) IV Continuous <Continuous>  dextrose 50% Injectable 12.5 Gram(s) IV Push once  dextrose 50% Injectable 25 Gram(s) IV Push once  dextrose 50% Injectable 25 Gram(s) IV Push once  enoxaparin Injectable 40 milliGRAM(s) SubCutaneous every 24 hours  atorvastatin 40 milliGRAM(s) Oral at bedtime  guaiFENesin   Syrup  (Sugar-Free) 200 milliGRAM(s) Oral every 4 hours  nystatin    Suspension 155155 Unit(s) Oral four times a day  insulin lispro (HumaLOG) corrective regimen sliding scale   SubCutaneous Before meals and at bedtime  pantoprazole    Tablet 40 milliGRAM(s) Oral two times a day before meals  sucralfate suspension 1 Gram(s) Oral four times a day  predniSONE   Tablet 40 milliGRAM(s) Oral daily    MEDICATIONS  (PRN):  acetaminophen   Tablet 650 milliGRAM(s) Oral every 6 hours PRN For Temp greater than 38 C (100.4 F)  acetaminophen   Tablet. 650 milliGRAM(s) Oral every 6 hours PRN Moderate Pain (4 - 6)  magnesium hydroxide Suspension 15 milliLiter(s) Oral at bedtime PRN Constipation  bisacodyl Suppository 10 milliGRAM(s) Rectal daily PRN Constipation  dextrose Gel 1 Dose(s) Oral once PRN Blood Glucose LESS THAN 70 milliGRAM(s)/deciliter  glucagon  Injectable 1 milliGRAM(s) IntraMuscular once PRN Glucose LESS THAN 70 milligrams/deciliter  benzocaine 15 mG/menthol 3.6 mG Lozenge 1 Lozenge Oral every 6 hours PRN Sore Throat  ondansetron Injectable 4 milliGRAM(s) IV Push every 6 hours PRN Nausea and/or Vomiting  aluminum hydroxide/magnesium hydroxide/simethicone Suspension 30 milliLiter(s) Oral every 4 hours PRN Dyspepsia  ALBUTerol/ipratropium for Nebulization 3 milliLiter(s) Nebulizer every 6 hours PRN Shortness of Breath and/or Wheezing      Allergies    No Known Drug Allergies  shellfish (Flushing (Skin); Hives)    Intolerances    codeine (Nausea)      Vital Signs Last 24 Hrs  T(C): 36.7 (30 Aug 2017 07:30), Max: 37 (29 Aug 2017 17:53)  T(F): 98 (30 Aug 2017 07:30), Max: 98.6 (29 Aug 2017 17:53)  HR: 67 (30 Aug 2017 07:30) (53 - 79)  BP: 132/78 (30 Aug 2017 07:30) (118/71 - 142/67)  BP(mean): --  RR: 18 (30 Aug 2017 07:30) (18 - 21)  SpO2: 98% (30 Aug 2017 07:30) (21% - 98%)  Daily     Daily     PHYSICAL EXAM:  GENERAL: NAD  EYES:  EOMI  NECK: Supple, No JVD  NERVOUS SYSTEM:  A/O x3,   CHEST/LUNG: No rales, few rhonchi,    HEART: Regular rate and rhythm; No murmurs  ABDOMEN: Soft, NT/ND BS+  EXTREMITIES:  +mild edema       LABS:                        11.0   23.6  )-----------( 96       ( 30 Aug 2017 07:54 )             34.1     08-30    140  |  100  |  64.0<H>  ----------------------------<  60<L>  3.6   |  25.0  |  1.90<H>    Ca    8.4<L>      30 Aug 2017 07:54                  RADIOLOGY & ADDITIONAL TESTS:

## 2017-08-30 NOTE — PROGRESS NOTE ADULT - ASSESSMENT
DEVORAH on CKD cr trending down h/o HTN DM  Cr trending down today 1.9  On prednisone for COPD  BUN high may be due to steroids  Renal sono no hydro  Leukocytosis likely due to steroids  No hydro on renal sono  AM labs

## 2017-08-30 NOTE — PROGRESS NOTE ADULT - ASSESSMENT
89 yr old female resident of [facility] with diabetes mellitus, hypertension, hyperlipidemia, CHF, COPD, Second hand smoke exposure admitted with complaints of exertional shortness of breath and wheezing. She was started on IV steroids for COPD exacerbation and Lasix for CHF exacerbation. CXR negative for infiltrate. Noted to have mild elevation on creatinine, which per family, has chronic problems with her kidneys. ECHO was done, which revealed improved EF and diastolic dysfunction. Her renal function worsened. Renal was consulted. Her steroids were tapered. Renal Ultrasound unremarkable.  She complained of midepigastric pain, burning sensation, which began during her hospitalization, worse with po intake.  Attributed to GERD / Gastritis.  Seen by GI, started on PPI and carafate.  CT A/P was ordered.  She has been unable to tolerate po due to pain.    > Gastritis / GERD - GI consulted and discussed with Dr. Robbins.  Added Carafate.  Continue PPI.  No further plans for intervention for now. CT A/P reviewed - ? mass in pancreas, will discuss with  family, would not recommend any work up

## 2017-08-30 NOTE — PROGRESS NOTE ADULT - SUBJECTIVE AND OBJECTIVE BOX
Pt seen and examined. She states that her reflux has slightly improved and she is able to keep food down. She remains on Prednisone 40 mg./d. with Pantoprazole 40 mg. PO BID and Carafate liquid 1 gram QID.    MEDICATIONS:  MEDICATIONS  (STANDING):  aspirin  chewable 81 milliGRAM(s) Oral daily  loratadine 10 milliGRAM(s) Oral daily  clopidogrel Tablet 75 milliGRAM(s) Oral daily  metoprolol 100 milliGRAM(s) Oral two times a day  dorzolamide 2% Ophthalmic Solution 1 Drop(s) Both EYES two times a day  latanoprost 0.005% Ophthalmic Solution 1 Drop(s) Both EYES at bedtime  hydrALAZINE 25 milliGRAM(s) Oral two times a day  multivitamin/minerals 1 Tablet(s) Oral daily  ascorbic acid 500 milliGRAM(s) Oral daily  insulin glargine Injectable (LANTUS) 25 Unit(s) SubCutaneous at bedtime  dextrose 5%. 1000 milliLiter(s) (50 mL/Hr) IV Continuous <Continuous>  dextrose 50% Injectable 12.5 Gram(s) IV Push once  dextrose 50% Injectable 25 Gram(s) IV Push once  dextrose 50% Injectable 25 Gram(s) IV Push once  enoxaparin Injectable 40 milliGRAM(s) SubCutaneous every 24 hours  atorvastatin 40 milliGRAM(s) Oral at bedtime  guaiFENesin   Syrup  (Sugar-Free) 200 milliGRAM(s) Oral every 4 hours  nystatin    Suspension 521999 Unit(s) Oral four times a day  insulin lispro (HumaLOG) corrective regimen sliding scale   SubCutaneous Before meals and at bedtime  pantoprazole    Tablet 40 milliGRAM(s) Oral two times a day before meals  sucralfate suspension 1 Gram(s) Oral four times a day  predniSONE   Tablet 40 milliGRAM(s) Oral daily    MEDICATIONS  (PRN):  acetaminophen   Tablet 650 milliGRAM(s) Oral every 6 hours PRN For Temp greater than 38 C (100.4 F)  acetaminophen   Tablet. 650 milliGRAM(s) Oral every 6 hours PRN Moderate Pain (4 - 6)  magnesium hydroxide Suspension 15 milliLiter(s) Oral at bedtime PRN Constipation  bisacodyl Suppository 10 milliGRAM(s) Rectal daily PRN Constipation  dextrose Gel 1 Dose(s) Oral once PRN Blood Glucose LESS THAN 70 milliGRAM(s)/deciliter  glucagon  Injectable 1 milliGRAM(s) IntraMuscular once PRN Glucose LESS THAN 70 milligrams/deciliter  benzocaine 15 mG/menthol 3.6 mG Lozenge 1 Lozenge Oral every 6 hours PRN Sore Throat  ondansetron Injectable 4 milliGRAM(s) IV Push every 6 hours PRN Nausea and/or Vomiting  aluminum hydroxide/magnesium hydroxide/simethicone Suspension 30 milliLiter(s) Oral every 4 hours PRN Dyspepsia  ALBUTerol/ipratropium for Nebulization 3 milliLiter(s) Nebulizer every 6 hours PRN Shortness of Breath and/or Wheezing      Allergies    No Known Drug Allergies  shellfish (Flushing (Skin); Hives)    Intolerances    codeine (Nausea)      Vital Signs Last 24 Hrs  T(C): 36.7 (30 Aug 2017 07:30), Max: 37 (29 Aug 2017 17:53)  T(F): 98 (30 Aug 2017 07:30), Max: 98.6 (29 Aug 2017 17:53)  HR: 67 (30 Aug 2017 07:30) (53 - 79)  BP: 132/78 (30 Aug 2017 07:30) (118/71 - 142/67)  BP(mean): --  RR: 18 (30 Aug 2017 07:30) (18 - 21)  SpO2: 98% (30 Aug 2017 07:30) (21% - 98%)      PHYSICAL EXAM:    General: Well developed; well nourished; in no acute distress  HEENT: MMM, conjunctiva pink and sclera anicteric.  Lungs: clear to auscultation and percussion.  Cor: RRR S1, S2 only.  Gastrointestinal: Abdomen: Soft non-tender non-distended; Normal bowel sounds; No hepatosplenomegaly  Extremities: no cyanosis, clubbing or edema.  Skin: Warm and dry. No obvious rash  Neuro: Pt. a + o x 3.    LABS:      CBC Full  -  ( 30 Aug 2017 07:54 )  WBC Count : 23.6 K/uL  Hemoglobin : 11.0 g/dL  Hematocrit : 34.1 %  Platelet Count - Automated : 96 K/uL  Mean Cell Volume : 90.7 fl  Mean Cell Hemoglobin : 29.3 pg  Mean Cell Hemoglobin Concentration : 32.3 g/dL  Auto Neutrophil # : x  Auto Lymphocyte # : x  Auto Monocyte # : x  Auto Eosinophil # : x  Auto Basophil # : x  Auto Neutrophil % : x  Auto Lymphocyte % : x  Auto Monocyte % : x  Auto Eosinophil % : x  Auto Basophil % : x    08-30    140  |  100  |  64.0<H>  ----------------------------<  60<L>  3.6   |  25.0  |  1.90<H>    Ca    8.4<L>      30 Aug 2017 07:54                        RADIOLOGY & ADDITIONAL STUDIES (The following images were personally reviewed):

## 2017-08-31 DIAGNOSIS — R22.9 LOCALIZED SWELLING, MASS AND LUMP, UNSPECIFIED: ICD-10-CM

## 2017-08-31 LAB
ANION GAP SERPL CALC-SCNC: 14 MMOL/L — SIGNIFICANT CHANGE UP (ref 5–17)
BUN SERPL-MCNC: 60 MG/DL — HIGH (ref 8–20)
CALCIUM SERPL-MCNC: 8.5 MG/DL — LOW (ref 8.6–10.2)
CHLORIDE SERPL-SCNC: 101 MMOL/L — SIGNIFICANT CHANGE UP (ref 98–107)
CO2 SERPL-SCNC: 26 MMOL/L — SIGNIFICANT CHANGE UP (ref 22–29)
CREAT SERPL-MCNC: 1.86 MG/DL — HIGH (ref 0.5–1.3)
GLUCOSE SERPL-MCNC: 44 MG/DL — CRITICAL LOW (ref 70–115)
HCT VFR BLD CALC: 34.6 % — LOW (ref 37–47)
HGB BLD-MCNC: 11.2 G/DL — LOW (ref 12–16)
MCHC RBC-ENTMCNC: 29.6 PG — SIGNIFICANT CHANGE UP (ref 27–31)
MCHC RBC-ENTMCNC: 32.4 G/DL — SIGNIFICANT CHANGE UP (ref 32–36)
MCV RBC AUTO: 91.3 FL — SIGNIFICANT CHANGE UP (ref 81–99)
PLATELET # BLD AUTO: 99 K/UL — LOW (ref 150–400)
POTASSIUM SERPL-MCNC: 3.6 MMOL/L — SIGNIFICANT CHANGE UP (ref 3.5–5.3)
POTASSIUM SERPL-SCNC: 3.6 MMOL/L — SIGNIFICANT CHANGE UP (ref 3.5–5.3)
RBC # BLD: 3.79 M/UL — LOW (ref 4.4–5.2)
RBC # FLD: 14.1 % — SIGNIFICANT CHANGE UP (ref 11–15.6)
SODIUM SERPL-SCNC: 141 MMOL/L — SIGNIFICANT CHANGE UP (ref 135–145)
WBC # BLD: 26.1 K/UL — HIGH (ref 4.8–10.8)
WBC # FLD AUTO: 26.1 K/UL — HIGH (ref 4.8–10.8)

## 2017-08-31 PROCEDURE — 99233 SBSQ HOSP IP/OBS HIGH 50: CPT

## 2017-08-31 RX ORDER — FAMOTIDINE 10 MG/ML
20 INJECTION INTRAVENOUS
Qty: 0 | Refills: 0 | Status: DISCONTINUED | OUTPATIENT
Start: 2017-08-31 | End: 2017-09-02

## 2017-08-31 RX ORDER — DEXTROSE 50 % IN WATER 50 %
1 SYRINGE (ML) INTRAVENOUS ONCE
Qty: 0 | Refills: 0 | Status: COMPLETED | OUTPATIENT
Start: 2017-08-31 | End: 2017-08-31

## 2017-08-31 RX ORDER — INSULIN LISPRO 100/ML
10 VIAL (ML) SUBCUTANEOUS ONCE
Qty: 0 | Refills: 0 | Status: COMPLETED | OUTPATIENT
Start: 2017-08-31 | End: 2017-08-31

## 2017-08-31 RX ORDER — INSULIN GLARGINE 100 [IU]/ML
15 INJECTION, SOLUTION SUBCUTANEOUS AT BEDTIME
Qty: 0 | Refills: 0 | Status: DISCONTINUED | OUTPATIENT
Start: 2017-08-31 | End: 2017-09-02

## 2017-08-31 RX ADMIN — PANTOPRAZOLE SODIUM 40 MILLIGRAM(S): 20 TABLET, DELAYED RELEASE ORAL at 05:53

## 2017-08-31 RX ADMIN — Medication 1 GRAM(S): at 21:15

## 2017-08-31 RX ADMIN — Medication 12: at 18:31

## 2017-08-31 RX ADMIN — Medication 40 MILLIGRAM(S): at 05:52

## 2017-08-31 RX ADMIN — INSULIN GLARGINE 15 UNIT(S): 100 INJECTION, SOLUTION SUBCUTANEOUS at 21:15

## 2017-08-31 RX ADMIN — Medication 25 MILLIGRAM(S): at 05:53

## 2017-08-31 RX ADMIN — Medication 1 GRAM(S): at 12:04

## 2017-08-31 RX ADMIN — Medication 1 DOSE(S): at 08:13

## 2017-08-31 RX ADMIN — Medication 500000 UNIT(S): at 05:52

## 2017-08-31 RX ADMIN — Medication 500000 UNIT(S): at 18:35

## 2017-08-31 RX ADMIN — Medication 500 MILLIGRAM(S): at 12:04

## 2017-08-31 RX ADMIN — Medication 1 GRAM(S): at 18:35

## 2017-08-31 RX ADMIN — Medication 1 GRAM(S): at 05:52

## 2017-08-31 RX ADMIN — CLOPIDOGREL BISULFATE 75 MILLIGRAM(S): 75 TABLET, FILM COATED ORAL at 12:04

## 2017-08-31 RX ADMIN — Medication 25 MILLIGRAM(S): at 18:35

## 2017-08-31 RX ADMIN — Medication 81 MILLIGRAM(S): at 12:04

## 2017-08-31 RX ADMIN — Medication 6: at 21:15

## 2017-08-31 RX ADMIN — LATANOPROST 1 DROP(S): 0.05 SOLUTION/ DROPS OPHTHALMIC; TOPICAL at 21:15

## 2017-08-31 RX ADMIN — DORZOLAMIDE HYDROCHLORIDE 1 DROP(S): 20 SOLUTION/ DROPS OPHTHALMIC at 05:52

## 2017-08-31 RX ADMIN — ATORVASTATIN CALCIUM 40 MILLIGRAM(S): 80 TABLET, FILM COATED ORAL at 21:15

## 2017-08-31 RX ADMIN — PANTOPRAZOLE SODIUM 40 MILLIGRAM(S): 20 TABLET, DELAYED RELEASE ORAL at 18:35

## 2017-08-31 RX ADMIN — LORATADINE 10 MILLIGRAM(S): 10 TABLET ORAL at 12:04

## 2017-08-31 RX ADMIN — Medication 500000 UNIT(S): at 12:04

## 2017-08-31 RX ADMIN — FAMOTIDINE 20 MILLIGRAM(S): 10 INJECTION INTRAVENOUS at 18:37

## 2017-08-31 RX ADMIN — DORZOLAMIDE HYDROCHLORIDE 1 DROP(S): 20 SOLUTION/ DROPS OPHTHALMIC at 18:35

## 2017-08-31 RX ADMIN — ENOXAPARIN SODIUM 40 MILLIGRAM(S): 100 INJECTION SUBCUTANEOUS at 12:04

## 2017-08-31 RX ADMIN — Medication 10 UNIT(S): at 19:07

## 2017-08-31 RX ADMIN — Medication 1 TABLET(S): at 12:04

## 2017-08-31 RX ADMIN — Medication 500000 UNIT(S): at 21:15

## 2017-08-31 RX ADMIN — Medication 100 MILLIGRAM(S): at 18:35

## 2017-08-31 NOTE — PROVIDER CONTACT NOTE (CRITICAL VALUE NOTIFICATION) - SITUATION
Pt hyperglycemic as listed above and attending notified. Insulin administered and additional dose given; PM RN notified to continue monitoring.

## 2017-08-31 NOTE — PROGRESS NOTE ADULT - SUBJECTIVE AND OBJECTIVE BOX
NEPHROLOGY INTERVAL HPI/OVERNIGHT EVENTS:  pt clinically stable  no acute distress    MEDICATIONS  (STANDING):  aspirin  chewable 81 milliGRAM(s) Oral daily  loratadine 10 milliGRAM(s) Oral daily  clopidogrel Tablet 75 milliGRAM(s) Oral daily  metoprolol 100 milliGRAM(s) Oral two times a day  dorzolamide 2% Ophthalmic Solution 1 Drop(s) Both EYES two times a day  latanoprost 0.005% Ophthalmic Solution 1 Drop(s) Both EYES at bedtime  hydrALAZINE 25 milliGRAM(s) Oral two times a day  multivitamin/minerals 1 Tablet(s) Oral daily  ascorbic acid 500 milliGRAM(s) Oral daily  dextrose 5%. 1000 milliLiter(s) (50 mL/Hr) IV Continuous <Continuous>  dextrose 50% Injectable 12.5 Gram(s) IV Push once  dextrose 50% Injectable 25 Gram(s) IV Push once  dextrose 50% Injectable 25 Gram(s) IV Push once  enoxaparin Injectable 40 milliGRAM(s) SubCutaneous every 24 hours  atorvastatin 40 milliGRAM(s) Oral at bedtime  guaiFENesin   Syrup  (Sugar-Free) 200 milliGRAM(s) Oral every 4 hours  nystatin    Suspension [id number] Unit(s) Oral four times a day  insulin lispro (HumaLOG) corrective regimen sliding scale   SubCutaneous Before meals and at bedtime  pantoprazole    Tablet 40 milliGRAM(s) Oral two times a day before meals  sucralfate suspension 1 Gram(s) Oral four times a day  predniSONE   Tablet 40 milliGRAM(s) Oral daily  insulin glargine Injectable (LANTUS) 15 Unit(s) SubCutaneous at bedtime    MEDICATIONS  (PRN):  acetaminophen   Tablet 650 milliGRAM(s) Oral every 6 hours PRN For Temp greater than 38 C (100.4 F)  acetaminophen   Tablet. 650 milliGRAM(s) Oral every 6 hours PRN Moderate Pain (4 - 6)  magnesium hydroxide Suspension 15 milliLiter(s) Oral at bedtime PRN Constipation  bisacodyl Suppository 10 milliGRAM(s) Rectal daily PRN Constipation  dextrose Gel 1 Dose(s) Oral once PRN Blood Glucose LESS THAN 70 milliGRAM(s)/deciliter  glucagon  Injectable 1 milliGRAM(s) IntraMuscular once PRN Glucose LESS THAN 70 milligrams/deciliter  benzocaine 15 mG/menthol 3.6 mG Lozenge 1 Lozenge Oral every 6 hours PRN Sore Throat  ondansetron Injectable 4 milliGRAM(s) IV Push every 6 hours PRN Nausea and/or Vomiting  aluminum hydroxide/magnesium hydroxide/simethicone Suspension 30 milliLiter(s) Oral every 4 hours PRN Dyspepsia  ALBUTerol/ipratropium for Nebulization 3 milliLiter(s) Nebulizer every 6 hours PRN Shortness of Breath and/or Wheezing      Allergies    No Known Drug Allergies  shellfish (Flushing (Skin); Hives)    Intolerances        Vital Signs Last 24 Hrs  T(C): 36.8 (31 Aug 2017 08:14), Max: 36.9 (30 Aug 2017 23:43)  T(F): 98.2 (31 Aug 2017 08:14), Max: 98.5 (30 Aug 2017 23:43)  HR: 84 (31 Aug 2017 08:14) (56 - 84)  BP: 130/74 (31 Aug 2017 08:14) (125/81 - 133/71)  BP(mean): --  RR: 19 (31 Aug 2017 08:14) (18 - 19)  SpO2: 98% (31 Aug 2017 08:14) (98% - 98%)    PHYSICAL EXAM:  GENERAL: NAD  EYES:  EOMI  NECK: Supple, No JVD  NERVOUS SYSTEM:  A/O x3,   CHEST/LUNG: No rales, few rhonchi,    HEART: Regular rate and rhythm; No rub  ABDOMEN: Soft, NT/ND BS+  EXTREMITIES:  +tr edema     LABS:                        11.2   26.1  )-----------( 99       ( 31 Aug 2017 08:14 )             34.6     08-31    141  |  101  |  60.0<H>  ----------------------------<  44<LL>  3.6   |  26.0  |  1.86<H>    Ca    8.5<L>      31 Aug 2017 08:14    Creatinine, Serum: 1.73 mg/dL (08.22.17 @ 04:34)              RADIOLOGY & ADDITIONAL TESTS:  < from: US Renal (08.25.17 @ 15:30) >   EXAM:  US KIDNEY(S)                          PROCEDURE DATE:  08/25/2017          INTERPRETATION:  CLINICAL INFORMATION: Renal failure    COMPARISON: None available.    TECHNIQUE: Sonography of the kidneys and bladder. The study is limited by   body habitus and bowel gas.    FINDINGS:    Right kidney:  9.5 x 4.4 x 3.0 cm. is no mass hydronephrosis or calculus    Left kidney:  9.4 x 5.1 x 4.0 cm. No renal mass, hydronephrosis or   calculi.    Urinary bladder: Within normal limits.    The visualized aorta and IVC are unremarkable.    IMPRESSION:     Normal renal ultrasound.    < end of copied text >

## 2017-08-31 NOTE — PROGRESS NOTE ADULT - PROBLEM SELECTOR PLAN 6
likely due to continuous use of NSAIDs + lasix  Cr improving, off diuretics.  Renal f/u appreciated.

## 2017-08-31 NOTE — PROGRESS NOTE ADULT - PROBLEM SELECTOR PLAN 3
Continue Lantus, sliding scale coverage.  FS stable.  Dose of insulin decreased due to hypoglycemia episodes as pt not reliably taking po at present.

## 2017-08-31 NOTE — PROGRESS NOTE ADULT - ASSESSMENT
89 year old female resident of [facility] with DMII on lantus, HTN, HLD, CHF, COPD, Second hand smoke exposure, Hx of AICD placement admitted with complaints of exertional shortness of breath and wheezing. She was started on IV steroids for COPD exacerbation and Lasix for CHF exacerbation. CXR negative for infiltrate. Noted to have mild elevation on creatinine, which per family, has chronic problems with her kidneys. ECHO was done, which revealed improved EF and diastolic dysfunction. Her renal function worsened. Renal was consulted. Her steroids were tapered. Renal Ultrasound unremarkable.  She complained of midepigastric pain, burning sensation, which began during her hospitalization, worse with po intake.  Attributed to GERD / Gastritis.  Seen by GI, started on PPI and carafate.  CT A/P was ordered.  She has been unable to tolerate po due to pain.    > Gastritis / GERD - GI consulted and discussed with Dr. Robbins.  Added Carafate.  Continue PPI.  No further plans for intervention for now. CT A/P reviewed - ? mass in pancreas, will discuss with  family, would not recommend any work up

## 2017-08-31 NOTE — PROGRESS NOTE ADULT - ASSESSMENT
CRF(III): ARF improving  Elevated BUN in part due to steroids  - continue to monitor labs  - avoid overt nephrotoxins

## 2017-08-31 NOTE — PROVIDER CONTACT NOTE (CRITICAL VALUE NOTIFICATION) - SITUATION
Attending MD notified of above critical as listed above and insulin held, glucose repletion ordered/administered, future PM lantus order reduced, and pt asymptomatic.

## 2017-08-31 NOTE — PROGRESS NOTE ADULT - SUBJECTIVE AND OBJECTIVE BOX
Chief Complaint: Patient is a 89y old  Female who presents with a chief complaint of "pains in legs and thigh" (23 Aug 2017 00:26)    HPI: 89 year old female resident [facility] with diabetes mellitus, hypertension, hyperlipidemia, CHF, COPD, Second hand smoke exposure admitted with complaints of exertional shortness of breath and wheezing. She was started on IV steroids for COPD exacerbation and Lasix for CHF exacerbation. CXR negative for infiltrate. Noted to have mild elevation on creatinine, which per family, has chronic problems with her kidneys. ECHO was done, which revealed improved EF and diastolic dysfunction. Her renal function worsened. Renal was consulted. Her steroids were tapered. Renal Ultrasound unremarkable.  She complained of midepigastric pain, burning sensation, which began during her hospitalization, worse with po intake.  Attributed to GERD / Gastritis.  Seen by GI, started on PPI and carafate.  CT A/P was ordered.  She has been unable to tolerate po due to pain.    SOB improved.  No cough.  C/o persistent midepigastric pain/heartburn. + Soft BMs and episode of vomiting last night. Noted episode of hypoglycemia    Other ROS reviewed and neg     ____________________PHYSICAL EXAM:    GENERAL:  NAD Alert and Oriented x 3   HEENT: NCAT  CARDIOVASCULAR:  S1, S2, regular  LUNGS: coarse BS b/l,  minimal crackles bibasilarly  ABDOMEN:  soft, (-) tenderness, (-) distension, (+) bowel sounds, (-) guarding, (-) rebound (-) rigidity  EXTREMITIES:  no cyanosis / clubbing / edema.   NEURO: CN II-XII grossly intact, DTR + 1 BL  ____________________  Vital Signs Last 24 Hrs  T(C): 36.8 (31 Aug 2017 08:14), Max: 36.9 (30 Aug 2017 23:43)  T(F): 98.2 (31 Aug 2017 08:14), Max: 98.5 (30 Aug 2017 23:43)  HR: 84 (31 Aug 2017 08:14) (56 - 84)  BP: 130/74 (31 Aug 2017 08:14) (125/81 - 133/71)  RR: 19 (31 Aug 2017 08:14) (18 - 19)  SpO2: 98% (31 Aug 2017 08:14) (98% - 98%)                        11.2   26.1  )-----------( 99       ( 31 Aug 2017 08:14 )             34.6     31 Aug 2017 08:14    141    |  101    |  60.0   ----------------------------<  44     3.6     |  26.0   |  1.86     Ca    8.5        31 Aug 2017 08:14    CAPILLARY BLOOD GLUCOSE  113 (31 Aug 2017 09:51)  63 (31 Aug 2017 07:53)  46 (31 Aug 2017 07:47)  276 (30 Aug 2017 22:00)  307 (30 Aug 2017 15:54)    MEDICATIONS  (STANDING):  aspirin  chewable 81 milliGRAM(s) Oral daily  loratadine 10 milliGRAM(s) Oral daily  clopidogrel Tablet 75 milliGRAM(s) Oral daily  metoprolol 100 milliGRAM(s) Oral two times a day  dorzolamide 2% Ophthalmic Solution 1 Drop(s) Both EYES two times a day  latanoprost 0.005% Ophthalmic Solution 1 Drop(s) Both EYES at bedtime  hydrALAZINE 25 milliGRAM(s) Oral two times a day  multivitamin/minerals 1 Tablet(s) Oral daily  ascorbic acid 500 milliGRAM(s) Oral daily  dextrose 5%. 1000 milliLiter(s) (50 mL/Hr) IV Continuous <Continuous>  dextrose 50% Injectable 12.5 Gram(s) IV Push once  dextrose 50% Injectable 25 Gram(s) IV Push once  dextrose 50% Injectable 25 Gram(s) IV Push once  enoxaparin Injectable 40 milliGRAM(s) SubCutaneous every 24 hours  atorvastatin 40 milliGRAM(s) Oral at bedtime  guaiFENesin   Syrup  (Sugar-Free) 200 milliGRAM(s) Oral every 4 hours  nystatin    Suspension 982497 Unit(s) Oral four times a day  insulin lispro (HumaLOG) corrective regimen sliding scale   SubCutaneous Before meals and at bedtime  pantoprazole    Tablet 40 milliGRAM(s) Oral two times a day before meals  sucralfate suspension 1 Gram(s) Oral four times a day  predniSONE   Tablet 40 milliGRAM(s) Oral daily  insulin glargine Injectable (LANTUS) 15 Unit(s) SubCutaneous at bedtime    MEDICATIONS  (PRN):  acetaminophen   Tablet 650 milliGRAM(s) Oral every 6 hours PRN For Temp greater than 38 C (100.4 F)  acetaminophen   Tablet. 650 milliGRAM(s) Oral every 6 hours PRN Moderate Pain (4 - 6)  magnesium hydroxide Suspension 15 milliLiter(s) Oral at bedtime PRN Constipation  bisacodyl Suppository 10 milliGRAM(s) Rectal daily PRN Constipation  dextrose Gel 1 Dose(s) Oral once PRN Blood Glucose LESS THAN 70 milliGRAM(s)/deciliter  glucagon  Injectable 1 milliGRAM(s) IntraMuscular once PRN Glucose LESS THAN 70 milligrams/deciliter  benzocaine 15 mG/menthol 3.6 mG Lozenge 1 Lozenge Oral every 6 hours PRN Sore Throat  ondansetron Injectable 4 milliGRAM(s) IV Push every 6 hours PRN Nausea and/or Vomiting  aluminum hydroxide/magnesium hydroxide/simethicone Suspension 30 milliLiter(s) Oral every 4 hours PRN Dyspepsia  ALBUTerol/ipratropium for Nebulization 3 milliLiter(s) Nebulizer every 6 hours PRN Shortness of Breath and/or Wheezing

## 2017-09-01 ENCOUNTER — TRANSCRIPTION ENCOUNTER (OUTPATIENT)
Age: 82
End: 2017-09-01

## 2017-09-01 LAB
ANION GAP SERPL CALC-SCNC: 13 MMOL/L — SIGNIFICANT CHANGE UP (ref 5–17)
BUN SERPL-MCNC: 58 MG/DL — HIGH (ref 8–20)
CALCIUM SERPL-MCNC: 8.5 MG/DL — LOW (ref 8.6–10.2)
CHLORIDE SERPL-SCNC: 100 MMOL/L — SIGNIFICANT CHANGE UP (ref 98–107)
CO2 SERPL-SCNC: 26 MMOL/L — SIGNIFICANT CHANGE UP (ref 22–29)
CREAT SERPL-MCNC: 1.83 MG/DL — HIGH (ref 0.5–1.3)
GLUCOSE SERPL-MCNC: 108 MG/DL — SIGNIFICANT CHANGE UP (ref 70–115)
HCT VFR BLD CALC: 34.8 % — LOW (ref 37–47)
HGB BLD-MCNC: 10.9 G/DL — LOW (ref 12–16)
MCHC RBC-ENTMCNC: 29.3 PG — SIGNIFICANT CHANGE UP (ref 27–31)
MCHC RBC-ENTMCNC: 31.3 G/DL — LOW (ref 32–36)
MCV RBC AUTO: 93.5 FL — SIGNIFICANT CHANGE UP (ref 81–99)
PLATELET # BLD AUTO: 76 K/UL — LOW (ref 150–400)
POTASSIUM SERPL-MCNC: 3.2 MMOL/L — LOW (ref 3.5–5.3)
POTASSIUM SERPL-SCNC: 3.2 MMOL/L — LOW (ref 3.5–5.3)
RBC # BLD: 3.72 M/UL — LOW (ref 4.4–5.2)
RBC # FLD: 14.1 % — SIGNIFICANT CHANGE UP (ref 11–15.6)
SODIUM SERPL-SCNC: 139 MMOL/L — SIGNIFICANT CHANGE UP (ref 135–145)
WBC # BLD: 26.1 K/UL — HIGH (ref 4.8–10.8)
WBC # FLD AUTO: 26.1 K/UL — HIGH (ref 4.8–10.8)

## 2017-09-01 PROCEDURE — 99233 SBSQ HOSP IP/OBS HIGH 50: CPT

## 2017-09-01 RX ORDER — POTASSIUM CHLORIDE 20 MEQ
40 PACKET (EA) ORAL ONCE
Qty: 0 | Refills: 0 | Status: COMPLETED | OUTPATIENT
Start: 2017-09-01 | End: 2017-09-01

## 2017-09-01 RX ORDER — FAMOTIDINE 10 MG/ML
1 INJECTION INTRAVENOUS
Qty: 0 | Refills: 0 | COMMUNITY
Start: 2017-09-01

## 2017-09-01 RX ORDER — SUCRALFATE 1 G
10 TABLET ORAL
Qty: 0 | Refills: 0 | COMMUNITY
Start: 2017-09-01

## 2017-09-01 RX ORDER — PANTOPRAZOLE SODIUM 20 MG/1
1 TABLET, DELAYED RELEASE ORAL
Qty: 0 | Refills: 0 | COMMUNITY
Start: 2017-09-01

## 2017-09-01 RX ADMIN — Medication 100 MILLIGRAM(S): at 18:46

## 2017-09-01 RX ADMIN — Medication 1 GRAM(S): at 12:06

## 2017-09-01 RX ADMIN — PANTOPRAZOLE SODIUM 40 MILLIGRAM(S): 20 TABLET, DELAYED RELEASE ORAL at 18:45

## 2017-09-01 RX ADMIN — Medication 500000 UNIT(S): at 12:07

## 2017-09-01 RX ADMIN — Medication 1 TABLET(S): at 12:05

## 2017-09-01 RX ADMIN — ATORVASTATIN CALCIUM 40 MILLIGRAM(S): 80 TABLET, FILM COATED ORAL at 23:50

## 2017-09-01 RX ADMIN — LORATADINE 10 MILLIGRAM(S): 10 TABLET ORAL at 12:04

## 2017-09-01 RX ADMIN — ENOXAPARIN SODIUM 40 MILLIGRAM(S): 100 INJECTION SUBCUTANEOUS at 12:02

## 2017-09-01 RX ADMIN — DORZOLAMIDE HYDROCHLORIDE 1 DROP(S): 20 SOLUTION/ DROPS OPHTHALMIC at 18:40

## 2017-09-01 RX ADMIN — Medication 25 MILLIGRAM(S): at 18:45

## 2017-09-01 RX ADMIN — Medication 500 MILLIGRAM(S): at 12:02

## 2017-09-01 RX ADMIN — Medication 1 GRAM(S): at 23:49

## 2017-09-01 RX ADMIN — Medication 500000 UNIT(S): at 23:49

## 2017-09-01 RX ADMIN — Medication 40 MILLIEQUIVALENT(S): at 12:02

## 2017-09-01 RX ADMIN — CLOPIDOGREL BISULFATE 75 MILLIGRAM(S): 75 TABLET, FILM COATED ORAL at 12:03

## 2017-09-01 RX ADMIN — Medication 6: at 18:38

## 2017-09-01 RX ADMIN — Medication 1 GRAM(S): at 05:38

## 2017-09-01 RX ADMIN — DORZOLAMIDE HYDROCHLORIDE 1 DROP(S): 20 SOLUTION/ DROPS OPHTHALMIC at 05:38

## 2017-09-01 RX ADMIN — FAMOTIDINE 20 MILLIGRAM(S): 10 INJECTION INTRAVENOUS at 18:41

## 2017-09-01 RX ADMIN — Medication 500000 UNIT(S): at 05:38

## 2017-09-01 RX ADMIN — LATANOPROST 1 DROP(S): 0.05 SOLUTION/ DROPS OPHTHALMIC; TOPICAL at 23:50

## 2017-09-01 RX ADMIN — Medication 81 MILLIGRAM(S): at 12:03

## 2017-09-01 RX ADMIN — PANTOPRAZOLE SODIUM 40 MILLIGRAM(S): 20 TABLET, DELAYED RELEASE ORAL at 05:39

## 2017-09-01 RX ADMIN — Medication 1 GRAM(S): at 18:46

## 2017-09-01 RX ADMIN — Medication 25 MILLIGRAM(S): at 05:39

## 2017-09-01 RX ADMIN — FAMOTIDINE 20 MILLIGRAM(S): 10 INJECTION INTRAVENOUS at 05:39

## 2017-09-01 RX ADMIN — Medication 500000 UNIT(S): at 18:47

## 2017-09-01 NOTE — PROGRESS NOTE ADULT - ATTENDING COMMENTS
Heartburn - stopped steroids, added pepcid, cont PPI  DNR/DNI Heartburn - stopped steroids, added pepcid, cont PPI  DNR/DNI  Hypokalemia - repleted, likely due to intracellular shift  Leukocytosis - due to steroids - monitor  Thrombocytopenia - was present on admission, monitor

## 2017-09-01 NOTE — PROGRESS NOTE ADULT - PROBLEM SELECTOR PLAN 8
on CT abd - ? pancreatic + GB with ? mets at lungs - will have discussion with family today and patient - likely comfort care and transition to outpt hospice when deteriorates further
on CT abd - ? pancreatic + GB with ? mets at lungs - will have discussion with family today and patient - likely comfort care and transition to outpt hospice when deteriorates further

## 2017-09-01 NOTE — DISCHARGE NOTE ADULT - PLAN OF CARE
prevention of exacerbation continue BDs as needed daily weights, monitor I&Os, resume lasix after reevaluation with cardiology lantus + HISS - monitor glucose PPI, carafate, pepcid statin monitor BP no further work up

## 2017-09-01 NOTE — PROGRESS NOTE ADULT - SUBJECTIVE AND OBJECTIVE BOX
Patient seen and examined    Feels fine; had some abd discomfort  better after BM  no c/o CP SOB NV   No swelling feet    Vital Signs Last 24 Hrs  T(C): 36.7 (09-01-17 @ 16:22), Max: 36.7 (09-01-17 @ 16:22)  T(F): 98 (09-01-17 @ 16:22), Max: 98 (09-01-17 @ 16:22)  HR: 84 (09-01-17 @ 18:47) (57 - 84)  BP: 132/78 (09-01-17 @ 18:47) (125/63 - 155/83)  BP(mean): --  RR: 18 (09-01-17 @ 16:22) (18 - 18)  SpO2: 100% (09-01-17 @ 16:22) (96% - 100%)    Chest   clear  CV   no murmur  Abd   soft, NT BS+  Extr   No edema  Neuro  grossly iintact motor      01 Sep 2017 08:20    139    |  100    |  58.0   ----------------------------<  108    3.2     |  26.0   |  1.83     Ca    8.5        01 Sep 2017 08:20                            10.9   26.1  )-----------( 76       ( 01 Sep 2017 08:20 )             34.8       Creat sl better  Supplement K  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

## 2017-09-01 NOTE — PROGRESS NOTE ADULT - SUBJECTIVE AND OBJECTIVE BOX
Chief Complaint: Patient is a 89y old  Female who presents with a chief complaint of "pains in legs and thigh" (23 Aug 2017 00:26)    HPI: 89 year old female resident of [facility] with diabetes mellitus, hypertension, hyperlipidemia, CHF, COPD, Second hand smoke exposure admitted with complaints of exertional shortness of breath and wheezing. She was started on IV steroids for COPD exacerbation and Lasix for CHF exacerbation. CXR negative for infiltrate. Noted to have mild elevation on creatinine, which per family, has chronic problems with her kidneys. ECHO was done, which revealed improved EF and diastolic dysfunction. Her renal function worsened. Renal was consulted. Her steroids were tapered. Renal Ultrasound unremarkable.  She complained of midepigastric pain, burning sensation, which began during her hospitalization, worse with po intake.  Attributed to GERD / Gastritis.  Seen by GI, started on PPI and carafate.  CT A/P was ordered.  She has been unable to tolerate po due to pain.    SOB improved.  No cough.  C/o persistent midepigastric pain/heartburn. + Soft BMs and episode of vomiting last night. Noted another episode of hypoglycemia  this am with confusion - resolved with D50 and glucose gel, no diarrhea, heartburn improved, was hyperglycemic last evening  Other ROS reviewed and neg     ____________________PHYSICAL EXAM:    GENERAL:  NAD Alert and Oriented x 3   HEENT: NCAT  CARDIOVASCULAR:  S1, S2, regular  LUNGS: coarse BS b/l,  minimal crackles bibasilarly  ABDOMEN:  soft, (-) tenderness, (-) distension, (+) bowel sounds, (-) guarding, (-) rebound (-) rigidity  EXTREMITIES:  no cyanosis / clubbing / edema.   NEURO: CN II-XII grossly intact, DTR + 1 BL  ____________________  Vital Signs Last 24 Hrs  T(C): 36.5 (01 Sep 2017 08:31), Max: 36.6 (31 Aug 2017 23:24)  T(F): 97.7 (01 Sep 2017 08:31), Max: 97.9 (31 Aug 2017 23:24)  HR: 57 (01 Sep 2017 08:31) (57 - 81)  BP: 155/83 (01 Sep 2017 08:31) (125/63 - 155/83)  RR: 18 (01 Sep 2017 08:31) (18 - 18)  SpO2: 100% (01 Sep 2017 08:31) (96% - 100%)                        10.9   26.1  )-----------( 76       ( 01 Sep 2017 08:20 )             34.8     01 Sep 2017 08:20    139    |  100    |  58.0   ----------------------------<  108    3.2     |  26.0   |  1.83     Ca    8.5        01 Sep 2017 08:20  CAPILLARY BLOOD GLUCOSE  87 (01 Sep 2017 07:05)  158 (01 Sep 2017 05:43)  33 (01 Sep 2017 05:32)  288 (31 Aug 2017 21:09)  417 (31 Aug 2017 18:49)  459 (31 Aug 2017 18:26)    MEDICATIONS  (STANDING):  aspirin  chewable 81 milliGRAM(s) Oral daily  loratadine 10 milliGRAM(s) Oral daily  clopidogrel Tablet 75 milliGRAM(s) Oral daily  metoprolol 100 milliGRAM(s) Oral two times a day  dorzolamide 2% Ophthalmic Solution 1 Drop(s) Both EYES two times a day  latanoprost 0.005% Ophthalmic Solution 1 Drop(s) Both EYES at bedtime  hydrALAZINE 25 milliGRAM(s) Oral two times a day  multivitamin/minerals 1 Tablet(s) Oral daily  ascorbic acid 500 milliGRAM(s) Oral daily  dextrose 5%. 1000 milliLiter(s) (50 mL/Hr) IV Continuous <Continuous>  dextrose 50% Injectable 12.5 Gram(s) IV Push once  dextrose 50% Injectable 25 Gram(s) IV Push once  dextrose 50% Injectable 25 Gram(s) IV Push once  enoxaparin Injectable 40 milliGRAM(s) SubCutaneous every 24 hours  atorvastatin 40 milliGRAM(s) Oral at bedtime  guaiFENesin   Syrup  (Sugar-Free) 200 milliGRAM(s) Oral every 4 hours  nystatin    Suspension 352269 Unit(s) Oral four times a day  insulin lispro (HumaLOG) corrective regimen sliding scale   SubCutaneous Before meals and at bedtime  pantoprazole    Tablet 40 milliGRAM(s) Oral two times a day before meals  sucralfate suspension 1 Gram(s) Oral four times a day  predniSONE   Tablet 40 milliGRAM(s) Oral daily  insulin glargine Injectable (LANTUS) 15 Unit(s) SubCutaneous at bedtime    MEDICATIONS  (PRN):  acetaminophen   Tablet 650 milliGRAM(s) Oral every 6 hours PRN For Temp greater than 38 C (100.4 F)  acetaminophen   Tablet. 650 milliGRAM(s) Oral every 6 hours PRN Moderate Pain (4 - 6)  magnesium hydroxide Suspension 15 milliLiter(s) Oral at bedtime PRN Constipation  bisacodyl Suppository 10 milliGRAM(s) Rectal daily PRN Constipation  dextrose Gel 1 Dose(s) Oral once PRN Blood Glucose LESS THAN 70 milliGRAM(s)/deciliter  glucagon  Injectable 1 milliGRAM(s) IntraMuscular once PRN Glucose LESS THAN 70 milligrams/deciliter  benzocaine 15 mG/menthol 3.6 mG Lozenge 1 Lozenge Oral every 6 hours PRN Sore Throat  ondansetron Injectable 4 milliGRAM(s) IV Push every 6 hours PRN Nausea and/or Vomiting  aluminum hydroxide/magnesium hydroxide/simethicone Suspension 30 milliLiter(s) Oral every 4 hours PRN Dyspepsia  ALBUTerol/ipratropium for Nebulization 3 milliLiter(s) Nebulizer every 6 hours PRN Shortness of Breath and/or Wheezing

## 2017-09-01 NOTE — PROGRESS NOTE ADULT - PROBLEM SELECTOR PLAN 7
Cr appears to be improving

## 2017-09-01 NOTE — PROGRESS NOTE ADULT - PROBLEM SELECTOR PROBLEM 1
COPD exacerbation
Gastroesophageal reflux disease, esophagitis presence not specified

## 2017-09-01 NOTE — DISCHARGE NOTE ADULT - SECONDARY DIAGNOSIS.
Acute on chronic diastolic congestive heart failure Type 2 diabetes mellitus without complication, unspecified long term insulin use status Gastroesophageal reflux disease, esophagitis presence not specified Hypercholesteremia Essential hypertension Mass

## 2017-09-01 NOTE — PROGRESS NOTE ADULT - PROBLEM SELECTOR PROBLEM 7
CKD (chronic kidney disease), stage IV

## 2017-09-01 NOTE — DISCHARGE NOTE ADULT - MEDICATION SUMMARY - MEDICATIONS TO STOP TAKING
I will STOP taking the medications listed below when I get home from the hospital:    acetaminophen 325 mg oral tablet  -- 2 tab(s) by mouth every 6 hours, As needed, For Temp over 37.9 C (100.2 F)    CeleBREX 200 mg oral capsule  -- 1 cap(s) by mouth 2 times a day    ascorbic acid 500 mg oral tablet  -- 1 tab(s) by mouth once a day    APAP/ASA/PPA  -- 2 tab(s) by mouth every 6 hours, As Needed    Lasix 40 mg oral tablet  -- 1 tab(s) by mouth once a day    potassium chloride 20 mEq oral granule, extended release  -- 1 cap(s) by mouth once a day    Diabetic Tuss 100 mg/5 mL oral liquid  -- 5 milliliter(s) by mouth every 4 hours, As Needed    Cepacol Dual Relief Sore Throat Cherry 5% mucous membrane spray  -- 1 spray(s) mucous membrane 4 times a day I will STOP taking the medications listed below when I get home from the hospital:    CeleBREX 200 mg oral capsule  -- 1 cap(s) by mouth 2 times a day    Lasix 40 mg oral tablet  -- 1 tab(s) by mouth once a day    potassium chloride 20 mEq oral granule, extended release  -- 1 cap(s) by mouth once a day

## 2017-09-01 NOTE — PROGRESS NOTE ADULT - PROBLEM SELECTOR PROBLEM 2
Congestive heart failure (CHF)
COPD exacerbation
Congestive heart failure (CHF)

## 2017-09-01 NOTE — DISCHARGE NOTE ADULT - HOSPITAL COURSE
89 year old female resident of [facility] with DMII on lantus, HTN, HLD, CHF, COPD, Second hand smoke exposure, Hx of AICD placement admitted with complaints of exertional shortness of breath and wheezing. She was started on IV steroids for COPD exacerbation and Lasix for CHF exacerbation. CXR negative for infiltrate. Noted to have mild elevation on creatinine, which per family, has chronic problems with her kidneys. ECHO was done, which revealed improved EF and diastolic dysfunction. Her renal function worsened. Renal was consulted. Her steroids were tapered. Renal Ultrasound unremarkable.  She complained of midepigastric pain, burning sensation, which began during her hospitalization, worse with po intake.  Attributed to GERD / Gastritis.  Seen by GI, started on PPI and carafate.  CT A/P was ordered.  She has been unable to tolerate po due to pain.    > Gastritis / GERD - GI consulted and discussed with Dr. Robbins.  Added Carafate.  Continue PPI.  No further plans for intervention for now. CT A/P reviewed - ? mass in pancreas, discussed with  family and patient - no further work up      Problem/Plan - 1:  ·  Problem: COPD exacerbation.  Plan: resolved  Stop  steroids taper, cont duoneb, supplemental oxygen.  OOB with PT.     Problem/Plan - 2:  ·  Problem: Congestive heart failure (CHF).  Plan: Chronic Diastolic CHF - Continue to hold Lasix. Stable.     Problem/Plan - 3:  ·  Problem: Diabetes.  Plan: Continue Lantus, sliding scale coverage.  FS stable.  Dose of insulin decreased due to hypoglycemia episodes as pt not reliably taking po at present. Would not decrease any further as pt's oral intakes improved.     Problem/Plan - 4:  ·  Problem: Hypercholesteremia.  Plan: Continue statin.     Problem/Plan - 5:  ·  Problem: Hypertension.  Plan: Continue medications.     Problem/Plan - 6:  Problem: DEVORAH (acute kidney injury). Plan: likely due to continuous use of NSAIDs + lasix  Cr improving, off diuretics.  Renal f/u appreciated.    Problem/Plan - 7:  ·  Problem: CKD (chronic kidney disease), stage IV.  Plan: Cr appears to be improving.     Problem/Plan - 8:  ·  Problem: Mass.  Plan: on CT abd - ? pancreatic + GB with ? mets at lungs - discussed with family today and patient - comfort care and transition to outpt hospice when deteriorates further, MOLST completed, DNR/DNI.     Attending Attestation:   Heartburn - stopped steroids, added pepcid, cont PPI  DNR/DNI  Hypokalemia - repleted, likely due to intracellular shift  Leukocytosis - due to steroids - monitor  Thrombocytopenia - was present on admission, monitor.     55 minutes spent on total encounter; more than 50% of the visit was spent counseling and/or coordinating care by the attending physician.     Plan discussed with HCP, family at bedside. 89 year old female resident of [facility] with DMII on lantus, HTN, HLD, CHF, COPD, Second hand smoke exposure, Hx of AICD placement admitted with complaints of exertional shortness of breath and wheezing. She was started on IV steroids for COPD exacerbation and Lasix for CHF exacerbation. CXR negative for infiltrate. Noted to have mild elevation on creatinine, which per family, has chronic problems with her kidneys. ECHO was done, which revealed improved EF and diastolic dysfunction. Her renal function worsened. Renal was consulted. Her steroids were tapered. Renal Ultrasound unremarkable.  She complained of midepigastric pain, burning sensation, which began during her hospitalization, worse with po intake.  Attributed to GERD / Gastritis.  Seen by GI, started on PPI and carafate.  CT A/P was ordered.  She has been unable to tolerate po due to pain.    > Gastritis / GERD - GI consulted and discussed with Dr. Robbins.  Added Carafate.  Continue PPI.  No further plans for intervention for now. CT A/P reviewed - ? mass in pancreas, discussed with  family and patient - no further work up      Problem/Plan - 1:  ·  Problem: COPD exacerbation.  Plan: resolved  Stop  steroids taper, cont duoneb, supplemental oxygen.  OOB with PT.     Problem/Plan - 2:  ·  Problem: Congestive heart failure (CHF).  Plan: Chronic Diastolic CHF - Continue to hold Lasix. Stable.     Problem/Plan - 3:  ·  Problem: Diabetes.  Plan: Continue Lantus, sliding scale coverage.  FS stable.  Dose of insulin decreased due to hypoglycemia episodes as pt not reliably taking po at present. Would not decrease any further as pt's oral intakes improved.     Problem/Plan - 4:  ·  Problem: Hypercholesteremia.  Plan: Continue statin.     Problem/Plan - 5:  ·  Problem: Hypertension.  Plan: Continue medications.     Problem/Plan - 6:  Problem: DEVORAH (acute kidney injury). Plan: likely due to continuous use of NSAIDs + lasix  Cr improving, off diuretics.  Renal f/u appreciated.    Problem/Plan - 7:  ·  Problem: CKD (chronic kidney disease), stage IV.  Plan: Cr appears to be improving.     Problem/Plan - 8:  ·  Problem: Mass.  Plan: on CT abd - ? pancreatic + GB with ? mets at lungs - discussed with family today and patient - comfort care and transition to outpt hospice when deteriorates further, MOLST completed, DNR/DNI.     Attending Attestation:   Heartburn - stopped steroids, added pepcid, cont PPI  DNR/DNI  Hypokalemia - repleted, likely due to intracellular shift  Leukocytosis - due to steroids - monitor  Thrombocytopenia - was present on admission, monitor.     55 minutes spent on total encounter; more than 50% of the visit was spent counseling and/or coordinating care by the attending physician.     Plan discussed with HCP, family at bedside.    (Addendum 9/2/17 9:07am)  The patient remained stable overnight. She tolerated oral intake of breakfast and was without significant complaints on examination. Further laboratory studies were recommended to follow renal function, thrombocytopenia, and leukocytosis. No further intervention was planned for the possible pancreatic mass as per the families wishes. 89 year old female resident of [facility] with DMII on lantus, HTN, HLD, CHF, COPD, Second hand smoke exposure, Hx of AICD placement admitted with complaints of exertional shortness of breath and wheezing. She was started on IV steroids for COPD exacerbation and Lasix for CHF exacerbation. CXR negative for infiltrate. Noted to have mild elevation on creatinine, which per family, has chronic problems with her kidneys. ECHO was done, which revealed improved EF and diastolic dysfunction. Her renal function worsened. Renal was consulted. Her steroids were tapered. Renal Ultrasound unremarkable.  She complained of midepigastric pain, burning sensation, which began during her hospitalization, worse with po intake.  Attributed to GERD / Gastritis.  Seen by GI, started on PPI and carafate.  CT A/P was ordered.  She has been unable to tolerate po due to pain.    > Gastritis / GERD - GI consulted and discussed with Dr. Robbins.  Added Carafate.  Continue PPI.  No further plans for intervention for now. CT A/P reviewed - ? mass in pancreas, discussed with  family and patient - no further work up      Problem/Plan - 1:  ·  Problem: COPD exacerbation.  Plan: resolved  Stop  steroids taper, cont duoneb, supplemental oxygen.  OOB with PT.     Problem/Plan - 2:  ·  Problem: Congestive heart failure (CHF).  Plan: Chronic Diastolic CHF - Continue to hold Lasix. Stable.     Problem/Plan - 3:  ·  Problem: Diabetes.  Plan: Continue Lantus, sliding scale coverage.  FS stable.  Dose of insulin decreased due to hypoglycemia episodes as pt not reliably taking po at present. Would not decrease any further as pt's oral intakes improved.     Problem/Plan - 4:  ·  Problem: Hypercholesteremia.  Plan: Continue statin.     Problem/Plan - 5:  ·  Problem: Hypertension.  Plan: Continue medications.     Problem/Plan - 6:  Problem: DEVORAH (acute kidney injury). Plan: likely due to continuous use of NSAIDs + lasix  Cr improving, off diuretics.  Renal f/u appreciated.    Problem/Plan - 7:  ·  Problem: CKD (chronic kidney disease), stage IV.  Plan: Cr appears to be improving.     Problem/Plan - 8:  ·  Problem: Mass.  Plan: on CT abd - ? pancreatic + GB with ? mets at lungs - discussed with family today and patient - comfort care and transition to outpt hospice when deteriorates further, MOLST completed, DNR/DNI.     Attending Attestation:   Heartburn - stopped steroids, added pepcid, cont PPI  DNR/DNI  Hypokalemia - repleted, likely due to intracellular shift  Leukocytosis - due to steroids - monitor  Thrombocytopenia - was present on admission, monitor.     55 minutes spent on total encounter; more than 50% of the visit was spent counseling and/or coordinating care by the attending physician.     Plan discussed with HCP, family at bedside.    (Addendum 9/2/17 9:07am)  The patient remained stable overnight. She tolerated oral intake of breakfast and was without significant complaints on examination. Further laboratory studies were recommended to follow renal function, thrombocytopenia, and leukocytosis. No further intervention was planned for the possible pancreatic mass as per the families wishes.    35 minutes total time

## 2017-09-01 NOTE — PROGRESS NOTE ADULT - ASSESSMENT
89 year old female resident of [facility] with DMII on lantus, HTN, HLD, CHF, COPD, Second hand smoke exposure, Hx of AICD placement admitted with complaints of exertional shortness of breath and wheezing. She was started on IV steroids for COPD exacerbation and Lasix for CHF exacerbation. CXR negative for infiltrate. Noted to have mild elevation on creatinine, which per family, has chronic problems with her kidneys. ECHO was done, which revealed improved EF and diastolic dysfunction. Her renal function worsened. Renal was consulted. Her steroids were tapered. Renal Ultrasound unremarkable.  She complained of midepigastric pain, burning sensation, which began during her hospitalization, worse with po intake.  Attributed to GERD / Gastritis.  Seen by GI, started on PPI and carafate.  CT A/P was ordered.  She has been unable to tolerate po due to pain.    > Gastritis / GERD - GI consulted and discussed with Dr. Robbins.  Added Carafate.  Continue PPI.  No further plans for intervention for now. CT A/P reviewed - ? mass in pancreas, discussed with  family and patient - no further work up

## 2017-09-01 NOTE — PROGRESS NOTE ADULT - PROBLEM SELECTOR PLAN 3
Continue Lantus, sliding scale coverage.  FS stable.  Dose of insulin decreased due to hypoglycemia episodes as pt not reliably taking po at present. Would not decrease any further as pt's oral intakes improved

## 2017-09-01 NOTE — DISCHARGE NOTE ADULT - CARE PLAN
Principal Discharge DX:	Chronic obstructive pulmonary disease, unspecified COPD type  Goal:	prevention of exacerbation  Instructions for follow-up, activity and diet:	continue BDs as needed  Secondary Diagnosis:	Acute on chronic diastolic congestive heart failure  Instructions for follow-up, activity and diet:	daily weights, monitor I&Os, resume lasix after reevaluation with cardiology  Secondary Diagnosis:	Type 2 diabetes mellitus without complication, unspecified long term insulin use status  Instructions for follow-up, activity and diet:	lantus + HISS - monitor glucose  Secondary Diagnosis:	Gastroesophageal reflux disease, esophagitis presence not specified  Instructions for follow-up, activity and diet:	PPI, carafate, pepcid  Secondary Diagnosis:	Hypercholesteremia  Instructions for follow-up, activity and diet:	statin  Secondary Diagnosis:	Essential hypertension  Instructions for follow-up, activity and diet:	monitor BP  Secondary Diagnosis:	Mass  Instructions for follow-up, activity and diet:	no further work up

## 2017-09-01 NOTE — DISCHARGE NOTE ADULT - PATIENT PORTAL LINK FT
“You can access the FollowHealth Patient Portal, offered by [facility], by registering with the following website: [email or web address]”

## 2017-09-01 NOTE — DISCHARGE NOTE ADULT - CARE PROVIDERS DIRECT ADDRESSES
,DirectAddress_Unknown,[email or web address]

## 2017-09-01 NOTE — DISCHARGE NOTE ADULT - MEDICATION SUMMARY - MEDICATIONS TO TAKE
I will START or STAY ON the medications listed below when I get home from the hospital:    acetaminophen 325 mg oral tablet  -- 2 tab(s) by mouth every 6 hours, As needed, Moderate Pain  -- Indication: For pain    aspirin 81 mg oral tablet  -- 1 tab(s) by mouth once a day  -- Indication: For CAD    Milk of Magnesia 8% oral suspension  -- 15 milliliter(s) by mouth once a day (at bedtime)  -- Indication: For Bowel regimen    aluminum hydroxide-magnesium hydroxide 200 mg-200 mg/5 mL oral suspension  -- 30 milliliter(s) by mouth every 4 hours, As needed, Dyspepsia  -- Indication: For Dyspepsia    Lantus 100 units/mL subcutaneous solution  -- 15 unit(s) subcutaneous once a day (at bedtime)  -- Indication: For Diabetes    loratadine 10 mg oral tablet  -- 1 tab(s) by mouth once a day  -- Indication: For Allergies    pravastatin 40 mg oral tablet  -- 1 tab(s) by mouth once a day  -- Indication: For Hypercholesteremia    Plavix 75 mg oral tablet  -- 1 tab(s) by mouth once a day  -- Indication: For CAD    metoprolol tartrate 100 mg oral tablet  -- 1 tab(s) by mouth 2 times a day  -- Indication: For HTN    DuoNeb 0.5 mg-2.5 mg/3 mL inhalation solution  -- 3 milliliter(s) inhaled 4 times a day  -- Indication: For COPD (chronic obstructive pulmonary disease)    famotidine 20 mg oral tablet  -- 1 tab(s) by mouth 2 times a day  -- Indication: For Gastroesophageal reflux disease, esophagitis presence not specified    cranberry oral capsule  -- 400 milligram(s) by mouth once a day  -- Indication: For Prophylaxis    bisacodyl 10 mg rectal suppository  -- 1 suppository(ies) rectally once a day, As Needed  -- Indication: For Bowel regimen    sucralfate 1 g/10 mL oral suspension  -- 10 milliliter(s) by mouth 4 times a day  -- Indication: For Gastroesophageal reflux disease, esophagitis presence not specified    melatonin 3 mg oral tablet  -- 2 tab(s) by mouth once a day (at bedtime), As Needed  -- Indication: For Insomnia    Azopt 1% ophthalmic suspension  -- 1 drop(s) to each affected eye 2 times a day  -- Indication: For Eye drops    latanoprost 0.005% ophthalmic solution  -- 1 drop(s) to each affected eye once a day (in the evening)  -- Indication: For eye drops    pantoprazole 40 mg oral delayed release tablet  -- 1 tab(s) by mouth 2 times a day (before meals)  -- Indication: For Gastroesophageal reflux disease, esophagitis presence not specified    hydrALAZINE 25 mg oral tablet  -- 1 tab(s) by mouth 2 times a day  -- Indication: For HTN    Ocuvite Lutein oral tablet  -- 1 tab(s) by mouth once a day  -- Indication: For supplement I will START or STAY ON the medications listed below when I get home from the hospital:    acetaminophen 325 mg oral tablet  -- 2 tab(s) by mouth every 6 hours, As needed, Moderate Pain  -- Indication: For Pain    aspirin 81 mg oral tablet  -- 1 tab(s) by mouth once a day  -- Indication: For CAD    Milk of Magnesia 8% oral suspension  -- 15 milliliter(s) by mouth once a day (at bedtime)  -- Indication: For Constipation    aluminum hydroxide-magnesium hydroxide 200 mg-200 mg/5 mL oral suspension  -- 30 milliliter(s) by mouth every 4 hours, As needed, Dyspepsia  -- Indication: For Gastritis    Lantus 100 units/mL subcutaneous solution  -- 25 unit(s) subcutaneous once a day (at bedtime)  -- Indication: For DM    loratadine 10 mg oral tablet  -- 1 tab(s) by mouth once a day  -- Indication: For Rhinitis    pravastatin 40 mg oral tablet  -- 1 tab(s) by mouth once a day  -- Indication: For Hyperlipidemia    Plavix 75 mg oral tablet  -- 1 tab(s) by mouth once a day  -- Indication: For CAD    metoprolol tartrate 100 mg oral tablet  -- 1 tab(s) by mouth 2 times a day  -- Indication: For HTN    DuoNeb 0.5 mg-2.5 mg/3 mL inhalation solution  -- 3 milliliter(s) inhaled 4 times a day, As Needed  -- Indication: For COPD    famotidine 20 mg oral tablet  -- 1 tab(s) by mouth 2 times a day  -- Indication: For Gastritis    cranberry oral capsule  -- 400 milligram(s) by mouth once a day  -- Indication: For Supplement    bisacodyl 10 mg rectal suppository  -- 1 suppository(ies) rectally once a day, As Needed  -- Indication: For Constipation    sucralfate 1 g/10 mL oral suspension  -- 10 milliliter(s) by mouth 4 times a day  -- Indication: For Gastritis    melatonin 3 mg oral tablet  -- 2 tab(s) by mouth once a day (at bedtime), As Needed  -- Indication: For Supplement    Azopt 1% ophthalmic suspension  -- 1 drop(s) to each affected eye 2 times a day  -- Indication: For Glaucoma    latanoprost 0.005% ophthalmic solution  -- 1 drop(s) to each affected eye once a day (in the evening)  -- Indication: For Glaucoma    pantoprazole 40 mg oral delayed release tablet  -- 1 tab(s) by mouth 2 times a day (before meals)  -- Indication: For Gastritis    hydrALAZINE 25 mg oral tablet  -- 1 tab(s) by mouth 2 times a day  -- Indication: For HTN    Ocuvite Lutein oral tablet  -- 1 tab(s) by mouth once a day  -- Indication: For Supplement    ascorbic acid 500 mg oral tablet  -- 1 tab(s) by mouth once a day  -- Indication: For Supplement

## 2017-09-01 NOTE — DISCHARGE NOTE ADULT - CARE PROVIDER_API CALL
Umang De Luna), Internal Medicine; Nephrology  [address] [place] [address]  Phone: [phone]  Fax: [phone]    Arnulfo Crowe), Gastroenterology; Internal Medicine  [address]  [place] [address]  Phone: [phone]  Fax: [phone]

## 2017-09-02 VITALS
RESPIRATION RATE: 18 BRPM | SYSTOLIC BLOOD PRESSURE: 128 MMHG | DIASTOLIC BLOOD PRESSURE: 61 MMHG | OXYGEN SATURATION: 97 % | TEMPERATURE: 98 F | HEART RATE: 70 BPM

## 2017-09-02 LAB
ANION GAP SERPL CALC-SCNC: 13 MMOL/L — SIGNIFICANT CHANGE UP (ref 5–17)
BUN SERPL-MCNC: 51 MG/DL — HIGH (ref 8–20)
CALCIUM SERPL-MCNC: 8.5 MG/DL — LOW (ref 8.6–10.2)
CHLORIDE SERPL-SCNC: 103 MMOL/L — SIGNIFICANT CHANGE UP (ref 98–107)
CO2 SERPL-SCNC: 25 MMOL/L — SIGNIFICANT CHANGE UP (ref 22–29)
CREAT SERPL-MCNC: 1.81 MG/DL — HIGH (ref 0.5–1.3)
GLUCOSE SERPL-MCNC: 234 MG/DL — HIGH (ref 70–115)
HCT VFR BLD CALC: 32.5 % — LOW (ref 37–47)
HGB BLD-MCNC: 10.3 G/DL — LOW (ref 12–16)
MCHC RBC-ENTMCNC: 29.3 PG — SIGNIFICANT CHANGE UP (ref 27–31)
MCHC RBC-ENTMCNC: 31.7 G/DL — LOW (ref 32–36)
MCV RBC AUTO: 92.6 FL — SIGNIFICANT CHANGE UP (ref 81–99)
PLATELET # BLD AUTO: 110 K/UL — SIGNIFICANT CHANGE UP (ref 150–400)
POTASSIUM SERPL-MCNC: 4.1 MMOL/L — SIGNIFICANT CHANGE UP (ref 3.5–5.3)
POTASSIUM SERPL-SCNC: 4.1 MMOL/L — SIGNIFICANT CHANGE UP (ref 3.5–5.3)
RBC # BLD: 3.51 M/UL — LOW (ref 4.4–5.2)
RBC # FLD: 14.2 % — SIGNIFICANT CHANGE UP (ref 11–15.6)
SODIUM SERPL-SCNC: 141 MMOL/L — SIGNIFICANT CHANGE UP (ref 135–145)
WBC # BLD: 20.9 K/UL — HIGH (ref 4.8–10.8)
WBC # FLD AUTO: 20.9 K/UL — HIGH (ref 4.8–10.8)

## 2017-09-02 PROCEDURE — 99239 HOSP IP/OBS DSCHRG MGMT >30: CPT

## 2017-09-02 RX ORDER — POTASSIUM CHLORIDE 20 MEQ
1 PACKET (EA) ORAL
Qty: 0 | Refills: 0 | COMMUNITY

## 2017-09-02 RX ADMIN — Medication 4: at 08:32

## 2017-09-02 RX ADMIN — Medication 1 GRAM(S): at 05:43

## 2017-09-02 RX ADMIN — PANTOPRAZOLE SODIUM 40 MILLIGRAM(S): 20 TABLET, DELAYED RELEASE ORAL at 05:44

## 2017-09-02 RX ADMIN — CLOPIDOGREL BISULFATE 75 MILLIGRAM(S): 75 TABLET, FILM COATED ORAL at 11:26

## 2017-09-02 RX ADMIN — Medication 100 MILLIGRAM(S): at 05:45

## 2017-09-02 RX ADMIN — FAMOTIDINE 20 MILLIGRAM(S): 10 INJECTION INTRAVENOUS at 05:44

## 2017-09-02 RX ADMIN — DORZOLAMIDE HYDROCHLORIDE 1 DROP(S): 20 SOLUTION/ DROPS OPHTHALMIC at 05:44

## 2017-09-02 RX ADMIN — Medication 1 TABLET(S): at 11:26

## 2017-09-02 RX ADMIN — Medication 25 MILLIGRAM(S): at 05:43

## 2017-09-02 RX ADMIN — Medication 1 GRAM(S): at 11:26

## 2017-09-02 RX ADMIN — Medication 2: at 11:25

## 2017-09-02 RX ADMIN — LORATADINE 10 MILLIGRAM(S): 10 TABLET ORAL at 11:27

## 2017-09-02 RX ADMIN — Medication 500 MILLIGRAM(S): at 11:25

## 2017-09-02 RX ADMIN — ENOXAPARIN SODIUM 40 MILLIGRAM(S): 100 INJECTION SUBCUTANEOUS at 11:39

## 2017-09-02 RX ADMIN — Medication 500000 UNIT(S): at 11:26

## 2017-09-02 RX ADMIN — Medication 500000 UNIT(S): at 05:43

## 2017-09-02 RX ADMIN — Medication 81 MILLIGRAM(S): at 11:26

## 2017-09-02 NOTE — PROGRESS NOTE ADULT - ASSESSMENT
CRF(III): ARF improved==> renal function likely at baseline  - continue to monitor labs  - avoid overt nephrotoxins  - pt encouraged to f/u in office post discharge

## 2017-09-02 NOTE — PROGRESS NOTE ADULT - SUBJECTIVE AND OBJECTIVE BOX
NEPHROLOGY INTERVAL HPI/OVERNIGHT EVENTS:  pt seated in chair  no acute distress  tolerating diet    MEDICATIONS  (STANDING):  aspirin  chewable 81 milliGRAM(s) Oral daily  loratadine 10 milliGRAM(s) Oral daily  clopidogrel Tablet 75 milliGRAM(s) Oral daily  metoprolol 100 milliGRAM(s) Oral two times a day  dorzolamide 2% Ophthalmic Solution 1 Drop(s) Both EYES two times a day  latanoprost 0.005% Ophthalmic Solution 1 Drop(s) Both EYES at bedtime  hydrALAZINE 25 milliGRAM(s) Oral two times a day  multivitamin/minerals 1 Tablet(s) Oral daily  ascorbic acid 500 milliGRAM(s) Oral daily  dextrose 5%. 1000 milliLiter(s) (50 mL/Hr) IV Continuous <Continuous>  dextrose 50% Injectable 12.5 Gram(s) IV Push once  dextrose 50% Injectable 25 Gram(s) IV Push once  dextrose 50% Injectable 25 Gram(s) IV Push once  enoxaparin Injectable 40 milliGRAM(s) SubCutaneous every 24 hours  atorvastatin 40 milliGRAM(s) Oral at bedtime  guaiFENesin   Syrup  (Sugar-Free) 200 milliGRAM(s) Oral every 4 hours  nystatin    Suspension 274450 Unit(s) Oral four times a day  insulin lispro (HumaLOG) corrective regimen sliding scale   SubCutaneous Before meals and at bedtime  pantoprazole    Tablet 40 milliGRAM(s) Oral two times a day before meals  sucralfate suspension 1 Gram(s) Oral four times a day  insulin glargine Injectable (LANTUS) 15 Unit(s) SubCutaneous at bedtime  famotidine    Tablet 20 milliGRAM(s) Oral two times a day    MEDICATIONS  (PRN):  acetaminophen   Tablet 650 milliGRAM(s) Oral every 6 hours PRN For Temp greater than 38 C (100.4 F)  acetaminophen   Tablet. 650 milliGRAM(s) Oral every 6 hours PRN Moderate Pain (4 - 6)  magnesium hydroxide Suspension 15 milliLiter(s) Oral at bedtime PRN Constipation  bisacodyl Suppository 10 milliGRAM(s) Rectal daily PRN Constipation  dextrose Gel 1 Dose(s) Oral once PRN Blood Glucose LESS THAN 70 milliGRAM(s)/deciliter  glucagon  Injectable 1 milliGRAM(s) IntraMuscular once PRN Glucose LESS THAN 70 milligrams/deciliter  benzocaine 15 mG/menthol 3.6 mG Lozenge 1 Lozenge Oral every 6 hours PRN Sore Throat  ondansetron Injectable 4 milliGRAM(s) IV Push every 6 hours PRN Nausea and/or Vomiting  aluminum hydroxide/magnesium hydroxide/simethicone Suspension 30 milliLiter(s) Oral every 4 hours PRN Dyspepsia  ALBUTerol/ipratropium for Nebulization 3 milliLiter(s) Nebulizer every 6 hours PRN Shortness of Breath and/or Wheezing      Allergies    No Known Drug Allergies  shellfish (Flushing (Skin); Hives)    Intolerances    codeine (Nausea)        Vital Signs Last 24 Hrs  T(C): 36.6 (02 Sep 2017 07:24), Max: 36.7 (01 Sep 2017 16:22)  T(F): 97.8 (02 Sep 2017 07:24), Max: 98 (01 Sep 2017 16:22)  HR: 67 (02 Sep 2017 07:24) (66 - 84)  BP: 110/61 (02 Sep 2017 07:24) (110/61 - 132/78)  BP(mean): --  RR: 18 (02 Sep 2017 07:24) (18 - 18)  SpO2: 97% (02 Sep 2017 07:24) (97% - 100%)    PHYSICAL EXAM:  GENERAL: NAD  EYES:  EOMI  NECK: Supple, No JVD  NERVOUS SYSTEM:  A/O x3,   CHEST/LUNG: No rales, few rhonchi,    HEART: Regular rate and rhythm; No rub  ABDOMEN: Soft, NT/ND BS+  EXTREMITIES:  +tr edema stable    LABS:                        10.3   20.9  )-----------( x        ( 02 Sep 2017 08:36 )             32.5     09-02    141  |  103  |  51.0<H>  ----------------------------<  234<H>  4.1   |  25.0  |  1.81<H>    Ca    8.5<L>      02 Sep 2017 08:36    Creatinine, Serum: 1.83 mg/dL (09.01.17 @ 08:20)              RADIOLOGY & ADDITIONAL TESTS:

## 2017-09-02 NOTE — PROGRESS NOTE ADULT - SUBJECTIVE AND OBJECTIVE BOX
MAURICIO WILKESAIMEE   ------------------------------  The patient was seen and evaluated for COPD.  The patient is in no acute distress.  Denied any chest pain, palpitations, shortness of breath, or abdominal pain.  Eating breakfast.    Vital Signs Last 24 Hrs  T(C): 36.6 (02 Sep 2017 07:24), Max: 36.7 (01 Sep 2017 16:22)  T(F): 97.8 (02 Sep 2017 07:24), Max: 98 (01 Sep 2017 16:22)  HR: 67 (02 Sep 2017 07:24) (66 - 84)  BP: 110/61 (02 Sep 2017 07:24) (110/61 - 132/78)  BP(mean): --  RR: 18 (02 Sep 2017 07:24) (18 - 18)  SpO2: 97% (02 Sep 2017 07:24) (97% - 100%)    PHYSICAL EXAMINATION:  ----------------------------------------  General appearance: NAD, Awake, Alert  HEENT: NCAT, Conjunctiva clear, EOMI, Pupils reactive  Neck: Supple, No JVD, No tenderness  Lungs: Clear to auscultation, Breath sound equal bilaterally, No wheezes, No rales  Cardiovascular: S1S2, Regular rhythm  Abdomen: Soft, Nontender, Nondistended, No guarding/rebound, Positive bowel sounds  Extremities: No clubbing, No cyanosis, No calf tenderness, Mild lower extremity edema  Neuro: Strength equal bilaterally, No tremors  Psychiatric: Appropriate mood, Normal affect    CAPILLARY BLOOD GLUCOSE  221 (02 Sep 2017 07:48)  87 (01 Sep 2017 07:05)  158 (01 Sep 2017 05:43)    LABS:  ------------------------------             10.9   26.1  )-----------( 76       ( 01 Sep 2017 08:20 )             34.8     09-01    139  |  100  |  58.0<H>  ----------------------------<  108  3.2<L>   |  26.0  |  1.83<H>    Ca    8.5<L>      01 Sep 2017 08:20    MEDICATIONS  (STANDING):  aspirin  chewable 81 milliGRAM(s) Oral daily  loratadine 10 milliGRAM(s) Oral daily  clopidogrel Tablet 75 milliGRAM(s) Oral daily  metoprolol 100 milliGRAM(s) Oral two times a day  dorzolamide 2% Ophthalmic Solution 1 Drop(s) Both EYES two times a day  latanoprost 0.005% Ophthalmic Solution 1 Drop(s) Both EYES at bedtime  hydrALAZINE 25 milliGRAM(s) Oral two times a day  multivitamin/minerals 1 Tablet(s) Oral daily  ascorbic acid 500 milliGRAM(s) Oral daily  dextrose 5%. 1000 milliLiter(s) (50 mL/Hr) IV Continuous <Continuous>  dextrose 50% Injectable 12.5 Gram(s) IV Push once  dextrose 50% Injectable 25 Gram(s) IV Push once  dextrose 50% Injectable 25 Gram(s) IV Push once  enoxaparin Injectable 40 milliGRAM(s) SubCutaneous every 24 hours  atorvastatin 40 milliGRAM(s) Oral at bedtime  guaiFENesin   Syrup  (Sugar-Free) 200 milliGRAM(s) Oral every 4 hours  nystatin    Suspension [id number] Unit(s) Oral four times a day  insulin lispro (HumaLOG) corrective regimen sliding scale   SubCutaneous Before meals and at bedtime  pantoprazole    Tablet 40 milliGRAM(s) Oral two times a day before meals  sucralfate suspension 1 Gram(s) Oral four times a day  insulin glargine Injectable (LANTUS) 15 Unit(s) SubCutaneous at bedtime  famotidine    Tablet 20 milliGRAM(s) Oral two times a day    MEDICATIONS  (PRN):  acetaminophen   Tablet 650 milliGRAM(s) Oral every 6 hours PRN For Temp greater than 38 C (100.4 F)  acetaminophen   Tablet. 650 milliGRAM(s) Oral every 6 hours PRN Moderate Pain (4 - 6)  magnesium hydroxide Suspension 15 milliLiter(s) Oral at bedtime PRN Constipation  bisacodyl Suppository 10 milliGRAM(s) Rectal daily PRN Constipation  dextrose Gel 1 Dose(s) Oral once PRN Blood Glucose LESS THAN 70 milliGRAM(s)/deciliter  glucagon  Injectable 1 milliGRAM(s) IntraMuscular once PRN Glucose LESS THAN 70 milligrams/deciliter  benzocaine 15 mG/menthol 3.6 mG Lozenge 1 Lozenge Oral every 6 hours PRN Sore Throat  ondansetron Injectable 4 milliGRAM(s) IV Push every 6 hours PRN Nausea and/or Vomiting  aluminum hydroxide/magnesium hydroxide/simethicone Suspension 30 milliLiter(s) Oral every 4 hours PRN Dyspepsia  ALBUTerol/ipratropium for Nebulization 3 milliLiter(s) Nebulizer every 6 hours PRN Shortness of Breath and/or Wheezing      ASSESSMENT / PLAN:  -----------------------------------  Diabetes - Insulin coverage, close monitoring of blood glucose levels.    Hypertension - Close blood pressure monitoring. On metoprolol and hydralazine.    Acute kidney injury on chronic kidney disease IV - Nephrology follow up noted. Off furosemide. Slowly improving.    Mass-like density at the pancreas - No intervention as per prior family conversation.    Thrombocytopenia - Continue to monitor closely. No bleeding noted.    Leukocytosis - Prednisone discontinued. Afebrile. Continued to monitor on repeat laboratory studies. MAURICIO WILKESAIMEE   ------------------------------  The patient was seen and evaluated for COPD.  The patient is in no acute distress.  Denied any chest pain, palpitations, shortness of breath, or abdominal pain.  Eating breakfast.    Vital Signs Last 24 Hrs  T(C): 36.6 (02 Sep 2017 07:24), Max: 36.7 (01 Sep 2017 16:22)  T(F): 97.8 (02 Sep 2017 07:24), Max: 98 (01 Sep 2017 16:22)  HR: 67 (02 Sep 2017 07:24) (66 - 84)  BP: 110/61 (02 Sep 2017 07:24) (110/61 - 132/78)  BP(mean): --  RR: 18 (02 Sep 2017 07:24) (18 - 18)  SpO2: 97% (02 Sep 2017 07:24) (97% - 100%)    PHYSICAL EXAMINATION:  ----------------------------------------  General appearance: NAD, Awake, Alert  HEENT: NCAT, Conjunctiva clear, EOMI, Pupils reactive  Neck: Supple, No JVD, No tenderness  Lungs: Clear to auscultation, Breath sound equal bilaterally, No wheezes, No rales  Cardiovascular: S1S2, Regular rhythm  Abdomen: Soft, Nontender, Nondistended, No guarding/rebound, Positive bowel sounds  Extremities: No clubbing, No cyanosis, No calf tenderness, Mild lower extremity edema  Neuro: Strength equal bilaterally, No tremors  Psychiatric: Appropriate mood, Normal affect    CAPILLARY BLOOD GLUCOSE  221 (02 Sep 2017 07:48)  87 (01 Sep 2017 07:05)  158 (01 Sep 2017 05:43)    LABS:  ------------------------------             10.9   26.1  )-----------( 76       ( 01 Sep 2017 08:20 )             34.8     09-01    139  |  100  |  58.0<H>  ----------------------------<  108  3.2<L>   |  26.0  |  1.83<H>    Ca    8.5<L>      01 Sep 2017 08:20    MEDICATIONS  (STANDING):  aspirin  chewable 81 milliGRAM(s) Oral daily  loratadine 10 milliGRAM(s) Oral daily  clopidogrel Tablet 75 milliGRAM(s) Oral daily  metoprolol 100 milliGRAM(s) Oral two times a day  dorzolamide 2% Ophthalmic Solution 1 Drop(s) Both EYES two times a day  latanoprost 0.005% Ophthalmic Solution 1 Drop(s) Both EYES at bedtime  hydrALAZINE 25 milliGRAM(s) Oral two times a day  multivitamin/minerals 1 Tablet(s) Oral daily  ascorbic acid 500 milliGRAM(s) Oral daily  dextrose 5%. 1000 milliLiter(s) (50 mL/Hr) IV Continuous <Continuous>  dextrose 50% Injectable 12.5 Gram(s) IV Push once  dextrose 50% Injectable 25 Gram(s) IV Push once  dextrose 50% Injectable 25 Gram(s) IV Push once  enoxaparin Injectable 40 milliGRAM(s) SubCutaneous every 24 hours  atorvastatin 40 milliGRAM(s) Oral at bedtime  guaiFENesin   Syrup  (Sugar-Free) 200 milliGRAM(s) Oral every 4 hours  nystatin    Suspension [id number] Unit(s) Oral four times a day  insulin lispro (HumaLOG) corrective regimen sliding scale   SubCutaneous Before meals and at bedtime  pantoprazole    Tablet 40 milliGRAM(s) Oral two times a day before meals  sucralfate suspension 1 Gram(s) Oral four times a day  insulin glargine Injectable (LANTUS) 15 Unit(s) SubCutaneous at bedtime  famotidine    Tablet 20 milliGRAM(s) Oral two times a day    MEDICATIONS  (PRN):  acetaminophen   Tablet 650 milliGRAM(s) Oral every 6 hours PRN For Temp greater than 38 C (100.4 F)  acetaminophen   Tablet. 650 milliGRAM(s) Oral every 6 hours PRN Moderate Pain (4 - 6)  magnesium hydroxide Suspension 15 milliLiter(s) Oral at bedtime PRN Constipation  bisacodyl Suppository 10 milliGRAM(s) Rectal daily PRN Constipation  dextrose Gel 1 Dose(s) Oral once PRN Blood Glucose LESS THAN 70 milliGRAM(s)/deciliter  glucagon  Injectable 1 milliGRAM(s) IntraMuscular once PRN Glucose LESS THAN 70 milligrams/deciliter  benzocaine 15 mG/menthol 3.6 mG Lozenge 1 Lozenge Oral every 6 hours PRN Sore Throat  ondansetron Injectable 4 milliGRAM(s) IV Push every 6 hours PRN Nausea and/or Vomiting  aluminum hydroxide/magnesium hydroxide/simethicone Suspension 30 milliLiter(s) Oral every 4 hours PRN Dyspepsia  ALBUTerol/ipratropium for Nebulization 3 milliLiter(s) Nebulizer every 6 hours PRN Shortness of Breath and/or Wheezing      ASSESSMENT / PLAN:  -----------------------------------  Diabetes - Insulin coverage, close monitoring of blood glucose levels.    Hypertension - Close blood pressure monitoring. On metoprolol and hydralazine.    Acute kidney injury on chronic kidney disease IV - Nephrology follow up noted. Off furosemide. Slowly improving.    Mass-like density at the pancreas - No intervention as per prior family conversation.    Thrombocytopenia - Continue to monitor closely. No bleeding noted.    Leukocytosis - Prednisone discontinued. Afebrile. Continued to monitor on repeat laboratory studies.    DNR/DNI on MOLST noted.

## 2017-10-19 PROCEDURE — 94760 N-INVAS EAR/PLS OXIMETRY 1: CPT

## 2017-10-19 PROCEDURE — 93306 TTE W/DOPPLER COMPLETE: CPT

## 2017-10-19 PROCEDURE — 84100 ASSAY OF PHOSPHORUS: CPT

## 2017-10-19 PROCEDURE — 80048 BASIC METABOLIC PNL TOTAL CA: CPT

## 2017-10-19 PROCEDURE — 94640 AIRWAY INHALATION TREATMENT: CPT

## 2017-10-19 PROCEDURE — 97163 PT EVAL HIGH COMPLEX 45 MIN: CPT

## 2017-10-19 PROCEDURE — 83880 ASSAY OF NATRIURETIC PEPTIDE: CPT

## 2017-10-19 PROCEDURE — 96374 THER/PROPH/DIAG INJ IV PUSH: CPT

## 2017-10-19 PROCEDURE — 36415 COLL VENOUS BLD VENIPUNCTURE: CPT

## 2017-10-19 PROCEDURE — 80053 COMPREHEN METABOLIC PANEL: CPT

## 2017-10-19 PROCEDURE — 71045 X-RAY EXAM CHEST 1 VIEW: CPT

## 2017-10-19 PROCEDURE — 86038 ANTINUCLEAR ANTIBODIES: CPT

## 2017-10-19 PROCEDURE — 74176 CT ABD & PELVIS W/O CONTRAST: CPT

## 2017-10-19 PROCEDURE — 71046 X-RAY EXAM CHEST 2 VIEWS: CPT

## 2017-10-19 PROCEDURE — 82550 ASSAY OF CK (CPK): CPT

## 2017-10-19 PROCEDURE — 93005 ELECTROCARDIOGRAM TRACING: CPT

## 2017-10-19 PROCEDURE — 85610 PROTHROMBIN TIME: CPT

## 2017-10-19 PROCEDURE — 76775 US EXAM ABDO BACK WALL LIM: CPT

## 2017-10-19 PROCEDURE — 84484 ASSAY OF TROPONIN QUANT: CPT

## 2017-10-19 PROCEDURE — 85027 COMPLETE CBC AUTOMATED: CPT

## 2017-10-19 PROCEDURE — 83735 ASSAY OF MAGNESIUM: CPT

## 2017-10-19 PROCEDURE — 99285 EMERGENCY DEPT VISIT HI MDM: CPT | Mod: 25

## 2017-10-19 PROCEDURE — 83036 HEMOGLOBIN GLYCOSYLATED A1C: CPT

## 2018-01-01 NOTE — PROGRESS NOTE ADULT - PROBLEM SELECTOR PROBLEM 5
Hypertension
immune

## 2018-04-30 ENCOUNTER — EMERGENCY (EMERGENCY)
Facility: HOSPITAL | Age: 83
LOS: 1 days | Discharge: HOME | End: 2018-04-30
Attending: EMERGENCY MEDICINE
Payer: MEDICARE

## 2018-04-30 VITALS
DIASTOLIC BLOOD PRESSURE: 77 MMHG | SYSTOLIC BLOOD PRESSURE: 149 MMHG | RESPIRATION RATE: 20 BRPM | OXYGEN SATURATION: 99 % | TEMPERATURE: 98 F | HEART RATE: 74 BPM

## 2018-04-30 VITALS
DIASTOLIC BLOOD PRESSURE: 76 MMHG | WEIGHT: 220.02 LBS | HEART RATE: 76 BPM | TEMPERATURE: 98 F | OXYGEN SATURATION: 97 % | SYSTOLIC BLOOD PRESSURE: 154 MMHG | RESPIRATION RATE: 20 BRPM | HEIGHT: 62 IN

## 2018-04-30 LAB
ALBUMIN SERPL ELPH-MCNC: 4.1 G/DL — SIGNIFICANT CHANGE UP (ref 3.3–5.2)
ALP SERPL-CCNC: 117 U/L — SIGNIFICANT CHANGE UP (ref 40–120)
ALT FLD-CCNC: 7 U/L — SIGNIFICANT CHANGE UP
ANION GAP SERPL CALC-SCNC: 15 MMOL/L — SIGNIFICANT CHANGE UP (ref 5–17)
ANISOCYTOSIS BLD QL: SLIGHT — SIGNIFICANT CHANGE UP
APPEARANCE UR: CLEAR — SIGNIFICANT CHANGE UP
AST SERPL-CCNC: 15 U/L — SIGNIFICANT CHANGE UP
BACTERIA # UR AUTO: ABNORMAL
BILIRUB SERPL-MCNC: 0.4 MG/DL — SIGNIFICANT CHANGE UP (ref 0.4–2)
BILIRUB UR-MCNC: NEGATIVE — SIGNIFICANT CHANGE UP
BUN SERPL-MCNC: 20 MG/DL — SIGNIFICANT CHANGE UP (ref 8–20)
CALCIUM SERPL-MCNC: 9.5 MG/DL — SIGNIFICANT CHANGE UP (ref 8.6–10.2)
CHLORIDE SERPL-SCNC: 96 MMOL/L — LOW (ref 98–107)
CO2 SERPL-SCNC: 25 MMOL/L — SIGNIFICANT CHANGE UP (ref 22–29)
COLOR SPEC: YELLOW — SIGNIFICANT CHANGE UP
CREAT SERPL-MCNC: 1.62 MG/DL — HIGH (ref 0.5–1.3)
DIFF PNL FLD: ABNORMAL
EOSINOPHIL NFR BLD AUTO: 2 % — SIGNIFICANT CHANGE UP (ref 0–5)
EPI CELLS # UR: SIGNIFICANT CHANGE UP
GLUCOSE SERPL-MCNC: 102 MG/DL — SIGNIFICANT CHANGE UP (ref 70–115)
GLUCOSE UR QL: NEGATIVE MG/DL — SIGNIFICANT CHANGE UP
HCT VFR BLD CALC: 34.3 % — LOW (ref 37–47)
HGB BLD-MCNC: 10.8 G/DL — LOW (ref 12–16)
HYPOCHROMIA BLD QL: SLIGHT — SIGNIFICANT CHANGE UP
KETONES UR-MCNC: NEGATIVE — SIGNIFICANT CHANGE UP
LEUKOCYTE ESTERASE UR-ACNC: ABNORMAL
LIDOCAIN IGE QN: 16 U/L — LOW (ref 22–51)
LYMPHOCYTES # BLD AUTO: 25 % — SIGNIFICANT CHANGE UP (ref 20–55)
MCHC RBC-ENTMCNC: 28.8 PG — SIGNIFICANT CHANGE UP (ref 27–31)
MCHC RBC-ENTMCNC: 31.5 G/DL — LOW (ref 32–36)
MCV RBC AUTO: 91.5 FL — SIGNIFICANT CHANGE UP (ref 81–99)
MICROCYTES BLD QL: SLIGHT — SIGNIFICANT CHANGE UP
MONOCYTES NFR BLD AUTO: 13 % — HIGH (ref 3–10)
NEUTROPHILS NFR BLD AUTO: 58 % — SIGNIFICANT CHANGE UP (ref 37–73)
NEUTS BAND # BLD: 1 % — SIGNIFICANT CHANGE UP (ref 0–8)
NITRITE UR-MCNC: NEGATIVE — SIGNIFICANT CHANGE UP
OVALOCYTES BLD QL SMEAR: SLIGHT — SIGNIFICANT CHANGE UP
PH UR: 5 — SIGNIFICANT CHANGE UP (ref 5–8)
PLAT MORPH BLD: NORMAL — SIGNIFICANT CHANGE UP
PLATELET # BLD AUTO: 93 K/UL — LOW (ref 150–400)
POIKILOCYTOSIS BLD QL AUTO: SLIGHT — SIGNIFICANT CHANGE UP
POTASSIUM SERPL-MCNC: 3.1 MMOL/L — LOW (ref 3.5–5.3)
POTASSIUM SERPL-SCNC: 3.1 MMOL/L — LOW (ref 3.5–5.3)
PROT SERPL-MCNC: 6.7 G/DL — SIGNIFICANT CHANGE UP (ref 6.6–8.7)
PROT UR-MCNC: 30 MG/DL
RBC # BLD: 3.75 M/UL — LOW (ref 4.4–5.2)
RBC # FLD: 14.3 % — SIGNIFICANT CHANGE UP (ref 11–15.6)
RBC BLD AUTO: ABNORMAL
SCHISTOCYTES BLD QL AUTO: SLIGHT — SIGNIFICANT CHANGE UP
SODIUM SERPL-SCNC: 136 MMOL/L — SIGNIFICANT CHANGE UP (ref 135–145)
SP GR SPEC: 1.01 — SIGNIFICANT CHANGE UP (ref 1.01–1.02)
UROBILINOGEN FLD QL: NEGATIVE MG/DL — SIGNIFICANT CHANGE UP
VARIANT LYMPHS # BLD: 1 % — SIGNIFICANT CHANGE UP (ref 0–6)
WBC # BLD: 7.4 K/UL — SIGNIFICANT CHANGE UP (ref 4.8–10.8)
WBC # FLD AUTO: 7.4 K/UL — SIGNIFICANT CHANGE UP (ref 4.8–10.8)
WBC UR QL: SIGNIFICANT CHANGE UP

## 2018-04-30 PROCEDURE — 71045 X-RAY EXAM CHEST 1 VIEW: CPT | Mod: 26

## 2018-04-30 PROCEDURE — 70450 CT HEAD/BRAIN W/O DYE: CPT | Mod: 26

## 2018-04-30 PROCEDURE — 99284 EMERGENCY DEPT VISIT MOD MDM: CPT | Mod: 25

## 2018-04-30 PROCEDURE — 71045 X-RAY EXAM CHEST 1 VIEW: CPT

## 2018-04-30 PROCEDURE — 83690 ASSAY OF LIPASE: CPT

## 2018-04-30 PROCEDURE — 93005 ELECTROCARDIOGRAM TRACING: CPT

## 2018-04-30 PROCEDURE — 81001 URINALYSIS AUTO W/SCOPE: CPT

## 2018-04-30 PROCEDURE — 93010 ELECTROCARDIOGRAM REPORT: CPT

## 2018-04-30 PROCEDURE — 99284 EMERGENCY DEPT VISIT MOD MDM: CPT

## 2018-04-30 PROCEDURE — 82962 GLUCOSE BLOOD TEST: CPT

## 2018-04-30 PROCEDURE — 36415 COLL VENOUS BLD VENIPUNCTURE: CPT

## 2018-04-30 PROCEDURE — 80053 COMPREHEN METABOLIC PANEL: CPT

## 2018-04-30 PROCEDURE — 70450 CT HEAD/BRAIN W/O DYE: CPT

## 2018-04-30 PROCEDURE — 85027 COMPLETE CBC AUTOMATED: CPT

## 2018-04-30 RX ORDER — POTASSIUM CHLORIDE 20 MEQ
40 PACKET (EA) ORAL ONCE
Qty: 0 | Refills: 0 | Status: COMPLETED | OUTPATIENT
Start: 2018-04-30 | End: 2018-04-30

## 2018-04-30 RX ORDER — POTASSIUM CHLORIDE 20 MEQ
40 PACKET (EA) ORAL ONCE
Qty: 0 | Refills: 0 | Status: DISCONTINUED | OUTPATIENT
Start: 2018-04-30 | End: 2018-05-05

## 2018-04-30 RX ORDER — SODIUM CHLORIDE 9 MG/ML
3 INJECTION INTRAMUSCULAR; INTRAVENOUS; SUBCUTANEOUS EVERY 8 HOURS
Qty: 0 | Refills: 0 | Status: DISCONTINUED | OUTPATIENT
Start: 2018-04-30 | End: 2018-05-05

## 2018-04-30 RX ADMIN — Medication 40 MILLIEQUIVALENT(S): at 05:10

## 2018-04-30 RX ADMIN — SODIUM CHLORIDE 3 MILLILITER(S): 9 INJECTION INTRAMUSCULAR; INTRAVENOUS; SUBCUTANEOUS at 05:07

## 2018-04-30 NOTE — ED PROVIDER NOTE - MEDICAL DECISION MAKING DETAILS
Pt with syncopal episode secondary to hypoglycemia. Will obtain priority CT scan, labs, EKG and reevaluate. Pt with syncopal episode secondary to hypoglycemia. No anemia, renal function at baseline, ekg non-ischemic, CTH neg. Pt reports that she gets recurrent episodes of hypoglycemia. Reported decreased PO intake at dinner the night before. Sugar read low, EMS found pt to be lethargic, and once glucagon/juice administered, pt returned ot baseline. She currently denies complaints, states she feels well, wisahes to return to [facility]. Episode is consistent with hypoglycemic episode. Stable for dc

## 2018-04-30 NOTE — ED PROVIDER NOTE - CHPI ED SYMPTOMS NEG
no back pain/no headache/no loss of consciousness/no nausea/no fever/no chills/no decreased eating/drinking/no pain/no vomiting

## 2018-04-30 NOTE — ED ADULT NURSE NOTE - CHIEF COMPLAINT QUOTE
Patient found in bathroom at [facility], BS <55, patient was given glucagon with repeat of 55, patient BS during transport 65, patient is A&OX3, no obvious signs of injury, BS in triage 75.

## 2018-04-30 NOTE — CHART NOTE - NSCHARTNOTEFT_GEN_A_CORE
SOCIAL WORK NOTE:  RECEIVED NOTIFICATION FOR DISCHARGE FROM [facility] CLERK.  PATIENT NEEDS AMBULANCE BACK TO [facility] WHERE PATIENT RESIDES LONG TERM.  AMABULANCE ARRANGED RETURN TO SNF AT 12 NOON AND CLINICALS FROM THE ER VISIT SENT TO [facility]

## 2018-04-30 NOTE — ED PROVIDER NOTE - SKIN, MLM
Skin normal color for race, warm, dry and intact. No evidence of rash. NO SIGNS OF DEFORMITY/TRAUMA.

## 2018-04-30 NOTE — ED PROVIDER NOTE - ENMT, MLM
Airway patent, Nasal mucosa clear. Mouth with normal & MOIST mucosa. Throat has no vesicles, no oropharyngeal exudates and uvula is midline.

## 2018-04-30 NOTE — ED PROVIDER NOTE - CARE PLAN
Principal Discharge DX:	Hypoglycemia  Secondary Diagnosis:	Syncope, non cardiac  Secondary Diagnosis:	Closed head injury

## 2018-04-30 NOTE — ED PROVIDER NOTE - OBJECTIVE STATEMENT
89 y/o F pt with a pmhx of DM, HTN, pacemaker/defibrillator, hyperlipidemia, CHF, hiatal hernia, hypercholesteremia, COPD, appendectomy and hysterectomy BIBA to the ED c/o syncopal episode that onset early this morning. Pt currently  living in [facility] [place]. Staff at the home sat the pt on the toilet. When they returned to the bathroom she was slumped over on the ground. Staff at [facility] activated EMS. [facility] checked pt's blood sugar and it was below 55. They gave the pt glucagon and juice. EMS notes a blood sugar at 65. EMS notes that the pt was conscious and slow to respond to questions. While en route to the ED pt became more and more interactive/responsive. Currently taking plavix. Denies bowel/bladder incontinence, chest pain, sob, saddle anesthesia, neurological/focal deficits, neck pain, back pain, blurred vision, LOC, change in mentation, abrasions, lacerations, bruising, bleeding, dizziness, n/v/d/c or any other complaints. Allergic to codeine sulfate and shelfsih.   PMD: Dr. Bowen.

## 2018-04-30 NOTE — ED PROVIDER NOTE - PROGRESS NOTE DETAILS
Patient has remained stable during her ED evaluation. Currently pending UA for disposition. Patient with syncope secondary to hypoglycemia.

## 2018-04-30 NOTE — ED ADULT TRIAGE NOTE - ESI TRIAGE ACUITY LEVEL, MLM
Assessment/Plan:    BPH with urinary obstruction  - Increased dosage of tamsulosin to 0 8   - FU in 4 weeks    Chronic low back pain  - Directed Pt to go to pain management for continued management  - Discussed that opioids are not long a viable term management  - PDMP showed only 1 percocet filled last year  - Percocet 5-325 PRN for unremitting pain  - Recommended continued activity and exercise  - Recommended Physical therapy  Pt deferred as "it didn't help in the past"    Blood pressure elevated without history of HTN  - Pt has discontinued lisinopril 5mg for the past 2 months  - BP us currently moderate well controlled  Discontinued lisinopril currently  May need to restart if change to Bp  Other insomnia  - Recommended trial of melatonin  - Recommended improved sleep hygiene  Discussed no screen time prior to bed  Sleeping on a schedule in a cool dark room  No exercise 2-3 hours prior to bed  - Return in 4 weeks to FU      DM type 2 (diabetes mellitus, type 2) ([facility] [address] )  - Discussed recent elevated A1C of 9 8   - Emphasized importance of tight diabetes management  - Continue Metformin 1000 mg twice daily  - Restart on Glipizide, 10mg  - FU in 4 weeks  - Will repeat A1C in 3 months  - Poor management may have been contributing to previous burry vision       Diagnoses and all orders for this visit:    BPH with urinary obstruction  -     tamsulosin (FLOMAX) 0 4 mg; Take 2 capsules (0 8 mg total) by mouth daily with dinner    Chronic low back pain, unspecified back pain laterality, with sciatica presence unspecified  -     HYDROcodone-acetaminophen (NORCO) 5-325 mg per tablet; Take 1 tablet by mouth every 6 (six) hours as needed for pain Earliest Fill Date: 3/13/18 Max Daily Amount: 4 tablets    Type 2 diabetes mellitus without complication, without long-term current use of insulin ([facility])  -     glipiZIDE (GLUCOTROL XL) 10 mg 24 hr tablet;  Take 1 tablet (10 mg total) by mouth daily    Other insomnia    Other orders  -     oxyCODONE-acetaminophen (PERCOCET) 5-325 mg per tablet; Take 1-2 tablets by mouth every 4 (four) hours as needed for moderate pain  -     Discontinue: HYDROcodone-acetaminophen (NORCO) 5-325 mg per tablet; Take 1 tablet by mouth every 6 (six) hours as needed for pain          Subjective:    Patient ID: Esmer Robins is a 62 y o  male  Pt is presenting today to establish care at this office, discuss his back pain, insomnia and to discuss his medications  Patient had a back simulator in installed by Dr El Tong in December of 2017, 4 months prior  He states that it only works for a short course  He has used Norco and Percocet in the past which had relieved the pain  He states his back pain is keeping him up  He is unable to sleep for longer then 4 hours  Diabetes - Pt disclosed that he has been cheating and having 2-3 chocolates every other day or so  He has been taking his metformin but not his glipizide  He has not had any issues with blurry vision in the past 2 months  He lost his insurance for several months and as such has not been taking all of his medications  Pt was wanting to decrease at home medications  Back Pain   This is a chronic problem  The pain is present in the gluteal  The pain radiates to the right knee  The pain is at a severity of 7/10  Pertinent negatives include no bladder incontinence, bowel incontinence or fever  The following portions of the patient's history were reviewed and updated as appropriate: allergies, current medications and problem list     Review of Systems   Constitutional: Negative for fever  Gastrointestinal: Negative for bowel incontinence  Genitourinary: Negative for bladder incontinence  Musculoskeletal: Positive for back pain           Objective:  /82   Pulse 90   Resp 18   Ht 5' 11" (1 803 m)   Wt 96 8 kg (213 lb 6 4 oz)   SpO2 97%   BMI 29 76 kg/m²      Physical Exam   Constitutional: He appears well-developed and 3 well-nourished  HENT:   Head: Normocephalic and atraumatic  Right Ear: Tympanic membrane and external ear normal    Left Ear: Tympanic membrane and external ear normal    Nose: Nose normal  No rhinorrhea  Mouth/Throat: Oropharynx is clear and moist  No oropharyngeal exudate  Cardiovascular: Normal rate, regular rhythm and normal heart sounds  Exam reveals no gallop and no friction rub  No murmur heard  Pulmonary/Chest: Effort normal and breath sounds normal  He has no wheezes  He has no rales  Lymphadenopathy:        Head (right side): No submental, no submandibular, no tonsillar, no preauricular and no posterior auricular adenopathy present  Head (left side): No submental, no submandibular, no tonsillar, no preauricular and no posterior auricular adenopathy present  He has no cervical adenopathy

## 2018-04-30 NOTE — ED ADULT TRIAGE NOTE - CHIEF COMPLAINT QUOTE
Patient found in bathroom at [facility], BS <55, patient was givevn glucagon with repeat of 55, patient BS during transport 65, patient is A&OX3, no obvious signs of injury, BS in triage 75. Patient found in bathroom at [facility], BS <55, patient was given glucagon with repeat of 55, patient BS during transport 65, patient is A&OX3, no obvious signs of injury, BS in triage 75.

## 2018-04-30 NOTE — ED ADULT NURSE REASSESSMENT NOTE - NS ED NURSE REASSESS COMMENT FT1
pt a+ox4, lying comfortably in bed. per MD orders, provided pt meal and juice. pt denies any pain or discomfort. will continue to monitor.

## 2018-04-30 NOTE — ED ADULT NURSE NOTE - OBJECTIVE STATEMENT
pt a+ox4, sent to ED from NH c/o hypoglycemia. pt states her sugar went from "around 200 to 55 and they sent me here". pt denies headache, dizziness or lightheadedness. pt denies chest pain or SOB. bilat lung sounds clear, resp even and unlabored. family @ bedside, pt in no apparent distress or discomfort, will continue to monitor.

## 2018-06-18 ENCOUNTER — INPATIENT (INPATIENT)
Facility: HOSPITAL | Age: 83
LOS: 0 days | Discharge: HOME | DRG: 951 | End: 2018-06-19
Attending: HOSPITALIST | Admitting: HOSPITALIST
Payer: COMMERCIAL

## 2018-06-18 VITALS
TEMPERATURE: 101 F | RESPIRATION RATE: 24 BRPM | WEIGHT: 167.11 LBS | SYSTOLIC BLOOD PRESSURE: 101 MMHG | OXYGEN SATURATION: 97 % | HEART RATE: 96 BPM | DIASTOLIC BLOOD PRESSURE: 53 MMHG

## 2018-06-18 DIAGNOSIS — T14.8XXA OTHER INJURY OF UNSPECIFIED BODY REGION, INITIAL ENCOUNTER: Chronic | ICD-10-CM

## 2018-06-18 DIAGNOSIS — A41.9 SEPSIS, UNSPECIFIED ORGANISM: ICD-10-CM

## 2018-06-18 LAB
ALBUMIN SERPL ELPH-MCNC: 3.8 G/DL — SIGNIFICANT CHANGE UP (ref 3.3–5.2)
ALP SERPL-CCNC: 292 U/L — HIGH (ref 40–120)
ALT FLD-CCNC: 110 U/L — HIGH
ANION GAP SERPL CALC-SCNC: 22 MMOL/L — HIGH (ref 5–17)
ANISOCYTOSIS BLD QL: SLIGHT — SIGNIFICANT CHANGE UP
APPEARANCE UR: CLEAR — SIGNIFICANT CHANGE UP
APTT BLD: 31.5 SEC — SIGNIFICANT CHANGE UP (ref 27.5–37.4)
AST SERPL-CCNC: 97 U/L — HIGH
BACTERIA # UR AUTO: ABNORMAL
BILIRUB SERPL-MCNC: 2.8 MG/DL — HIGH (ref 0.4–2)
BILIRUB UR-MCNC: NEGATIVE — SIGNIFICANT CHANGE UP
BUN SERPL-MCNC: 67 MG/DL — HIGH (ref 8–20)
BURR CELLS BLD QL SMEAR: PRESENT — SIGNIFICANT CHANGE UP
CALCIUM SERPL-MCNC: 9.3 MG/DL — SIGNIFICANT CHANGE UP (ref 8.6–10.2)
CHLORIDE SERPL-SCNC: 95 MMOL/L — LOW (ref 98–107)
CO2 SERPL-SCNC: 16 MMOL/L — LOW (ref 22–29)
COLOR SPEC: YELLOW — SIGNIFICANT CHANGE UP
CREAT SERPL-MCNC: 4.66 MG/DL — HIGH (ref 0.5–1.3)
DIFF PNL FLD: NEGATIVE — SIGNIFICANT CHANGE UP
EPI CELLS # UR: SIGNIFICANT CHANGE UP
GIANT PLATELETS BLD QL SMEAR: PRESENT — SIGNIFICANT CHANGE UP
GLUCOSE SERPL-MCNC: 217 MG/DL — HIGH (ref 70–115)
GLUCOSE UR QL: NEGATIVE MG/DL — SIGNIFICANT CHANGE UP
HCT VFR BLD CALC: 29.3 % — LOW (ref 37–47)
HGB BLD-MCNC: 9.3 G/DL — LOW (ref 12–16)
HYPOCHROMIA BLD QL: SLIGHT — SIGNIFICANT CHANGE UP
INR BLD: 1.51 RATIO — HIGH (ref 0.88–1.16)
KETONES UR-MCNC: NEGATIVE — SIGNIFICANT CHANGE UP
LACTATE BLDV-MCNC: 2.8 MMOL/L — HIGH (ref 0.5–2)
LEUKOCYTE ESTERASE UR-ACNC: ABNORMAL
LYMPHOCYTES # BLD AUTO: 2 % — LOW (ref 20–55)
MACROCYTES BLD QL: SLIGHT — SIGNIFICANT CHANGE UP
MANUAL DIF COMMENT BLD-IMP: SIGNIFICANT CHANGE UP
MCHC RBC-ENTMCNC: 29.2 PG — SIGNIFICANT CHANGE UP (ref 27–31)
MCHC RBC-ENTMCNC: 31.7 G/DL — LOW (ref 32–36)
MCV RBC AUTO: 92.1 FL — SIGNIFICANT CHANGE UP (ref 81–99)
METAMYELOCYTES # FLD: 1 % — HIGH (ref 0–0)
MICROCYTES BLD QL: SLIGHT — SIGNIFICANT CHANGE UP
MONOCYTES NFR BLD AUTO: 12 % — HIGH (ref 3–10)
NEUTROPHILS NFR BLD AUTO: 81 % — HIGH (ref 37–73)
NEUTS BAND # BLD: 4 % — SIGNIFICANT CHANGE UP (ref 0–8)
NITRITE UR-MCNC: NEGATIVE — SIGNIFICANT CHANGE UP
NT-PROBNP SERPL-SCNC: HIGH PG/ML (ref 0–300)
OVALOCYTES BLD QL SMEAR: SLIGHT — SIGNIFICANT CHANGE UP
PH UR: 5 — SIGNIFICANT CHANGE UP (ref 5–8)
PLAT MORPH BLD: SIGNIFICANT CHANGE UP
PLATELET # BLD AUTO: 99 K/UL — LOW (ref 150–400)
POIKILOCYTOSIS BLD QL AUTO: SLIGHT — SIGNIFICANT CHANGE UP
POTASSIUM SERPL-MCNC: 5.1 MMOL/L — SIGNIFICANT CHANGE UP (ref 3.5–5.3)
POTASSIUM SERPL-SCNC: 5.1 MMOL/L — SIGNIFICANT CHANGE UP (ref 3.5–5.3)
PROT SERPL-MCNC: 6.6 G/DL — SIGNIFICANT CHANGE UP (ref 6.6–8.7)
PROT UR-MCNC: 30 MG/DL
PROTHROM AB SERPL-ACNC: 16.8 SEC — HIGH (ref 9.8–12.7)
RBC # BLD: 3.18 M/UL — LOW (ref 4.4–5.2)
RBC # FLD: 14.7 % — SIGNIFICANT CHANGE UP (ref 11–15.6)
RBC BLD AUTO: ABNORMAL
RBC CASTS # UR COMP ASSIST: SIGNIFICANT CHANGE UP /HPF (ref 0–4)
SODIUM SERPL-SCNC: 133 MMOL/L — LOW (ref 135–145)
SP GR SPEC: 1.02 — SIGNIFICANT CHANGE UP (ref 1.01–1.02)
TROPONIN T SERPL-MCNC: 0.07 NG/ML — HIGH (ref 0–0.06)
UROBILINOGEN FLD QL: NEGATIVE MG/DL — SIGNIFICANT CHANGE UP
WBC # BLD: 60.8 K/UL — CRITICAL HIGH (ref 4.8–10.8)
WBC # FLD AUTO: 60.8 K/UL — CRITICAL HIGH (ref 4.8–10.8)
WBC UR QL: SIGNIFICANT CHANGE UP

## 2018-06-18 PROCEDURE — 99223 1ST HOSP IP/OBS HIGH 75: CPT | Mod: GC

## 2018-06-18 PROCEDURE — 71045 X-RAY EXAM CHEST 1 VIEW: CPT | Mod: 26

## 2018-06-18 PROCEDURE — 99284 EMERGENCY DEPT VISIT MOD MDM: CPT

## 2018-06-18 RX ORDER — POTASSIUM CHLORIDE 20 MEQ
1 PACKET (EA) ORAL
Qty: 0 | Refills: 0 | COMMUNITY

## 2018-06-18 RX ORDER — ATORVASTATIN CALCIUM 80 MG/1
40 TABLET, FILM COATED ORAL AT BEDTIME
Qty: 0 | Refills: 0 | Status: DISCONTINUED | OUTPATIENT
Start: 2018-06-18 | End: 2018-06-19

## 2018-06-18 RX ORDER — MULTIVIT-MIN/FERROUS GLUCONATE 9 MG/15 ML
1 LIQUID (ML) ORAL
Qty: 0 | Refills: 0 | COMMUNITY

## 2018-06-18 RX ORDER — CELECOXIB 200 MG/1
1 CAPSULE ORAL
Qty: 0 | Refills: 0 | COMMUNITY

## 2018-06-18 RX ORDER — CEFTRIAXONE 500 MG/1
1 INJECTION, POWDER, FOR SOLUTION INTRAMUSCULAR; INTRAVENOUS ONCE
Qty: 0 | Refills: 0 | Status: COMPLETED | OUTPATIENT
Start: 2018-06-18 | End: 2018-06-18

## 2018-06-18 RX ORDER — HYDRALAZINE HCL 50 MG
25 TABLET ORAL
Qty: 0 | Refills: 0 | Status: DISCONTINUED | OUTPATIENT
Start: 2018-06-18 | End: 2018-06-19

## 2018-06-18 RX ORDER — CLOPIDOGREL BISULFATE 75 MG/1
1 TABLET, FILM COATED ORAL
Qty: 0 | Refills: 0 | COMMUNITY

## 2018-06-18 RX ORDER — LANOLIN ALCOHOL/MO/W.PET/CERES
3 CREAM (GRAM) TOPICAL AT BEDTIME
Qty: 0 | Refills: 0 | Status: DISCONTINUED | OUTPATIENT
Start: 2018-06-18 | End: 2018-06-19

## 2018-06-18 RX ORDER — DEXTROSE 50 % IN WATER 50 %
15 SYRINGE (ML) INTRAVENOUS ONCE
Qty: 0 | Refills: 0 | Status: DISCONTINUED | OUTPATIENT
Start: 2018-06-18 | End: 2018-06-19

## 2018-06-18 RX ORDER — BRINZOLAMIDE 10 MG/ML
1 SUSPENSION/ DROPS OPHTHALMIC
Qty: 0 | Refills: 0 | COMMUNITY

## 2018-06-18 RX ORDER — INSULIN GLARGINE 100 [IU]/ML
25 INJECTION, SOLUTION SUBCUTANEOUS
Qty: 0 | Refills: 0 | COMMUNITY

## 2018-06-18 RX ORDER — KETOROLAC TROMETHAMINE 30 MG/ML
30 SYRINGE (ML) INJECTION ONCE
Qty: 0 | Refills: 0 | Status: DISCONTINUED | OUTPATIENT
Start: 2018-06-18 | End: 2018-06-18

## 2018-06-18 RX ORDER — CEFTRIAXONE 500 MG/1
1 INJECTION, POWDER, FOR SOLUTION INTRAMUSCULAR; INTRAVENOUS EVERY 24 HOURS
Qty: 0 | Refills: 0 | Status: DISCONTINUED | OUTPATIENT
Start: 2018-06-18 | End: 2018-06-19

## 2018-06-18 RX ORDER — DEXTROSE 50 % IN WATER 50 %
12.5 SYRINGE (ML) INTRAVENOUS ONCE
Qty: 0 | Refills: 0 | Status: DISCONTINUED | OUTPATIENT
Start: 2018-06-18 | End: 2018-06-19

## 2018-06-18 RX ORDER — ASCORBIC ACID 60 MG
500 TABLET,CHEWABLE ORAL DAILY
Qty: 0 | Refills: 0 | Status: DISCONTINUED | OUTPATIENT
Start: 2018-06-18 | End: 2018-06-19

## 2018-06-18 RX ORDER — SODIUM CHLORIDE 9 MG/ML
1000 INJECTION INTRAMUSCULAR; INTRAVENOUS; SUBCUTANEOUS
Qty: 0 | Refills: 0 | Status: DISCONTINUED | OUTPATIENT
Start: 2018-06-18 | End: 2018-06-19

## 2018-06-18 RX ORDER — LANOLIN ALCOHOL/MO/W.PET/CERES
2 CREAM (GRAM) TOPICAL
Qty: 0 | Refills: 0 | COMMUNITY

## 2018-06-18 RX ORDER — VANCOMYCIN HCL 1 G
1250 VIAL (EA) INTRAVENOUS ONCE
Qty: 0 | Refills: 0 | Status: COMPLETED | OUTPATIENT
Start: 2018-06-18 | End: 2018-06-19

## 2018-06-18 RX ORDER — SODIUM CHLORIDE 9 MG/ML
1000 INJECTION, SOLUTION INTRAVENOUS
Qty: 0 | Refills: 0 | Status: DISCONTINUED | OUTPATIENT
Start: 2018-06-18 | End: 2018-06-19

## 2018-06-18 RX ORDER — INSULIN GLARGINE 100 [IU]/ML
15 INJECTION, SOLUTION SUBCUTANEOUS
Qty: 0 | Refills: 0 | COMMUNITY

## 2018-06-18 RX ORDER — BENZOCAINE 10 %
1 GEL (GRAM) MUCOUS MEMBRANE
Qty: 0 | Refills: 0 | COMMUNITY

## 2018-06-18 RX ORDER — ASCORBIC ACID 60 MG
1 TABLET,CHEWABLE ORAL
Qty: 0 | Refills: 0 | COMMUNITY

## 2018-06-18 RX ORDER — INSULIN LISPRO 100/ML
VIAL (ML) SUBCUTANEOUS
Qty: 0 | Refills: 0 | Status: DISCONTINUED | OUTPATIENT
Start: 2018-06-18 | End: 2018-06-19

## 2018-06-18 RX ORDER — METOPROLOL TARTRATE 50 MG
100 TABLET ORAL
Qty: 0 | Refills: 0 | Status: DISCONTINUED | OUTPATIENT
Start: 2018-06-18 | End: 2018-06-19

## 2018-06-18 RX ORDER — MORPHINE SULFATE 50 MG/1
1 CAPSULE, EXTENDED RELEASE ORAL ONCE
Qty: 0 | Refills: 0 | Status: DISCONTINUED | OUTPATIENT
Start: 2018-06-18 | End: 2018-06-18

## 2018-06-18 RX ORDER — GLUCAGON INJECTION, SOLUTION 0.5 MG/.1ML
1 INJECTION, SOLUTION SUBCUTANEOUS ONCE
Qty: 0 | Refills: 0 | Status: DISCONTINUED | OUTPATIENT
Start: 2018-06-18 | End: 2018-06-19

## 2018-06-18 RX ORDER — FUROSEMIDE 40 MG
1 TABLET ORAL
Qty: 0 | Refills: 0 | COMMUNITY

## 2018-06-18 RX ORDER — CLOPIDOGREL BISULFATE 75 MG/1
75 TABLET, FILM COATED ORAL DAILY
Qty: 0 | Refills: 0 | Status: DISCONTINUED | OUTPATIENT
Start: 2018-06-18 | End: 2018-06-19

## 2018-06-18 RX ORDER — ASPIRIN/CALCIUM CARB/MAGNESIUM 324 MG
81 TABLET ORAL DAILY
Qty: 0 | Refills: 0 | Status: DISCONTINUED | OUTPATIENT
Start: 2018-06-18 | End: 2018-06-19

## 2018-06-18 RX ORDER — DEXTROSE 50 % IN WATER 50 %
25 SYRINGE (ML) INTRAVENOUS ONCE
Qty: 0 | Refills: 0 | Status: DISCONTINUED | OUTPATIENT
Start: 2018-06-18 | End: 2018-06-19

## 2018-06-18 RX ORDER — SODIUM CHLORIDE 9 MG/ML
2000 INJECTION INTRAMUSCULAR; INTRAVENOUS; SUBCUTANEOUS ONCE
Qty: 0 | Refills: 0 | Status: COMPLETED | OUTPATIENT
Start: 2018-06-18 | End: 2018-06-18

## 2018-06-18 RX ORDER — ONDANSETRON 8 MG/1
4 TABLET, FILM COATED ORAL EVERY 6 HOURS
Qty: 0 | Refills: 0 | Status: DISCONTINUED | OUTPATIENT
Start: 2018-06-18 | End: 2018-06-19

## 2018-06-18 RX ORDER — FENTANYL CITRATE 50 UG/ML
1 INJECTION INTRAVENOUS
Qty: 0 | Refills: 0 | Status: DISCONTINUED | OUTPATIENT
Start: 2018-06-18 | End: 2018-06-19

## 2018-06-18 RX ORDER — LATANOPROST 0.05 MG/ML
1 SOLUTION/ DROPS OPHTHALMIC; TOPICAL
Qty: 0 | Refills: 0 | COMMUNITY

## 2018-06-18 RX ADMIN — MORPHINE SULFATE 1 MILLIGRAM(S): 50 CAPSULE, EXTENDED RELEASE ORAL at 23:35

## 2018-06-18 RX ADMIN — Medication 30 MILLIGRAM(S): at 19:13

## 2018-06-18 RX ADMIN — SODIUM CHLORIDE 2000 MILLILITER(S): 9 INJECTION INTRAMUSCULAR; INTRAVENOUS; SUBCUTANEOUS at 15:33

## 2018-06-18 RX ADMIN — FENTANYL CITRATE 1 PATCH: 50 INJECTION INTRAVENOUS at 20:16

## 2018-06-18 RX ADMIN — ONDANSETRON 4 MILLIGRAM(S): 8 TABLET, FILM COATED ORAL at 23:35

## 2018-06-18 RX ADMIN — CEFTRIAXONE 100 GRAM(S): 500 INJECTION, POWDER, FOR SOLUTION INTRAMUSCULAR; INTRAVENOUS at 15:24

## 2018-06-18 RX ADMIN — Medication 30 MILLIGRAM(S): at 18:56

## 2018-06-18 NOTE — ED PROVIDER NOTE - MEDICAL DECISION MAKING DETAILS
ams with fever elevated wbc and renal failure; discussed with family pt is dnr/dni  molst form in chart

## 2018-06-18 NOTE — H&P ADULT - PROBLEM SELECTOR PLAN 2
Likely 2/2 pneumonia due to lung mets  Patient is DNR/DNI with comfort measures only as per discussion with family Likely 2/2 pneumonia due to lung mets  Patient wanted a trial of ABX and fluids while pending CAT scan. Now s/p Rocephin 1g, Vanco 125mg x1 dose and 2L NS bolus  Patient is DNR/DNI with comfort measures only as per discussion with family

## 2018-06-18 NOTE — ED ADULT NURSE REASSESSMENT NOTE - NS ED NURSE REASSESS COMMENT FT1
family at bedside. informed patient on plan of care. called MD. Lorenz for pain medications for patient.

## 2018-06-18 NOTE — H&P ADULT - HISTORY OF PRESENT ILLNESS
89 YOF with PMH of Dm, HTN, COPD, 89 YOF with PMH of DM, compression fracture, HTN, COPD, abdominal mass on CT scan (sept 2017) who was sent from [facility] because of 104 fever. As per daughter in love, who is health care proxy, patient has been intermittently disoriented for the past 8 weeks. She was treated for a UTI 3 weeks ago, with mild improvement but still intermittent AMS. Patient finished ABXs (unspecified) 5 days ago. Today patient vomited once,  dark in color. She has been complaining of abdominal pain and had 2 watery bowel movements. She also had a temp of 104. Patient is bedridden but was using wheelchair previously.     In the ED patient came hypotensive and lethargic, code sepsis was called.     Patient is DNR/DNI 89 YOF with PMH of DM, compression fracture, HTN, COPD, abdominal mass on CT scan (sept 2017) who was sent from [facility] because of 104 fever. As per daughter in love, who is health care proxy, patient has been intermittently disoriented for the past 8 weeks. She was treated for a UTI 3 weeks ago, with mild improvement but still intermittent AMS. Patient finished ABXs (unspecified) 5 days ago. Today patient vomited once,  dark in color. She has been complaining of abdominal pain and had 2 watery bowel movements. She also had a temp of 104. Patient is bedridden but was using wheelchair previously.     In the ED patient came hypotensive and lethargic, code sepsis was called. She received Ceftiaxone 1g, ketorolac 30mg and 2L of NS bolus, EKG was done    Patient is DNR/DNI 89 YOF with PMH of DM, compression fracture, HTN, COPD, abdominal mass on CT scan (sept 2017) who was sent from [facility] because of 104 fever. As per daughter in love, who is health care proxy, patient has been intermittently disoriented for the past 8 weeks. She was treated for a UTI 3 weeks ago, with mild improvement but still intermittent AMS. Patient finished ABXs (unspecified) 5 days ago. Today patient vomited once,  dark in color. She has been complaining of abdominal pain and had 2 watery bowel movements. She also had a temp of 104. Patient is bedridden but was using wheelchair previously.     In the ED patient came hypotensive and lethargic, code sepsis was called. She received Ceftiaxone 1g, ketorolac 30mg and 2L of NS bolus, EKG was done    Patient is DNR/DNI with comfort measures only as discussed with family at bedside.

## 2018-06-18 NOTE — H&P ADULT - ASSESSMENT
89 YOF with PMH of DM, compression fracture, HTN, COPD, abdominal mass on CT scan (sept 2017) admitted for sepsis and metastasis probably 2/2 pneumonia due to metastasis to multiple organs

## 2018-06-18 NOTE — H&P ADULT - NSHPLABSRESULTS_GEN_ALL_CORE
9.3    60.8  )-----------( 99       ( 18 Jun 2018 15:23 )             29.3     06-18  133<L>  |  95<L>  |  67.0<H>  ----------------------------<  217<H>  5.1   |  16.0<L>  |  4.66<H>  Ca    9.3      18 Jun 2018 15:23  TPro  6.6  /  Alb  3.8  /  TBili  2.8<H>  /  DBili  x   /  AST  97<H>  /  ALT  110<H>  /  AlkPhos  292<H>  06-18    EXAM:    CT Abdomen and Pelvis Without Intravenous Contrast   EXAM DATE/TIME:    6/18/2018 11:52 PM   CLINICAL HISTORY:    89 years old, female; Screening exam; Other: R/O mass   TECHNIQUE:    Axial computed tomography images of the abdomen and pelvis without intravenous contrast.    Coronal and sagittal reformatted images were created and reviewed.   COMPARISON:    CT ABDOMEN AND PELVIS WITH ORAL [id number] 12:58   FINDINGS:    Lower thorax:  Multiple bilateral pulmonary nodules measuring up to 2.1 cm in   size, some of which demonstrate central cavitation, compatible with either metastases or atypical pulmonary infection. There are also innumerable tiny pulmonary nodules with the same differential diagnosis.   ABDOMEN:    Liver: Normal. No mass.    Gallbladder and bile ducts:  Cholelithiasis. No cholecystitis or biliary ductal dilatation.    Pancreas: Normal. No ductal dilation.    Spleen: Normal. No splenomegaly.    Adrenals: Normal. No mass.    Kidneys and ureters:  There is mild to moderate left-sided hydronephrosis a proximal left hydroureter with transition point of the proximal left ureter as it courses into the left lateral aspect of the aforementioned   retroperitoneal mass, consistent with obstructive uropathy. No obstructive urolithiasis.  Stomach and bowel:  No bowel wall thickening or intestinal obstruction. There is mild fluid distention of the stomach and moderate fluid distention of the duodenum through the mid third portion with transition point as the mass   courses through the aforementioned retroperitoneal mass, consistent with partial obstruction secondary to the mass.   Appendix:  Appendix not visualized. No evidence of appendicitis.    Retroperitoneal space:  6 cm mass, presumably malignant, confluent with both the body of the pancreas and the superior periaortic retroperitoneum, most   likely primary pancreatic carcinoma.   PELVIS:    Bladder: Unremarkable as visualized.  Reproductive:  Prior hysterectomy.   ABDOMEN and PELVIS:    Intraperitoneal space: Normal. No free air. No significant fluid collection.    Bones/joints:  Multilevel chronic compression deformities of the lumbar spine.   Indeterminate sclerosis of the anterosuperior left iliac bone. Metastatic disease not excluded.    Soft tissues: Unremarkable.    Vasculature: Normal. No abdominal aortic aneurysm.    Lymph nodes: Normal. No enlarged lymph nodes.     IMPRESSION:   1. Multiple bilateral pulmonary nodules measuring up to 2.1 cm in size, some of which demonstrate central cavitation, compatible with either metastases or atypical pulmonary infection. There are also innumerable iny pulmonary nodules with the same differential diagnosis.   2. 6 cm mass, presumably malignant, confluent with both the body of the pancreas and the superior eriaortic retroperitoneum, most likely primary pancreatic carcinoma.   3. There is mild to moderate left-sided hydronephrosis a proximal left hydroureter with transition point of the proximal left ureter as it courses into the left lateral aspect of the aforementioned retroperitoneal ass, consistent with obstructive uropathy. No obstructive urolithiasis.   4. There is mild fluid distention of the stomach and moderate fluid distention of the duodenum through the mid third portion with transition point as the mass courses through the aforementioned retroperitoneal ass, consistent with partial obstruction secondary to the mass.   5. Indeterminate sclerosis of the anterosuperior left iliac bone.   Metastatic disease not excluded.  ******PRELIMINARY REPORT******    ******PRELIMINARY REPORT******        NKII RICK M.D.;[facility] RADIOLOGIST

## 2018-06-18 NOTE — H&P ADULT - NSHPSOCIALHISTORY_GEN_ALL_CORE
Denies EtOH, illicit drugs or smoking  She comes from [facility].   Used to work at the school [employer]

## 2018-06-18 NOTE — ED ADULT NURSE NOTE - PSH
Artificial pacemaker    Compound fracture    History of appendectomy    History of tonsillectomy    S/P PHOEBE (total abdominal hysterectomy)

## 2018-06-18 NOTE — H&P ADULT - PROBLEM SELECTOR PLAN 4
Patient is DNR/DNI with comfort measures only as per discussion with family Patient is DNR/DNI with comfort measures only.  Currently hypotensive. No need for hypertensive medications at this time.

## 2018-06-18 NOTE — ED ADULT TRIAGE NOTE - CHIEF COMPLAINT QUOTE
Pt biba from [facility], for fever, hypotension and lethargy.  Code sepsis called.  Pt alert but very lethargic

## 2018-06-18 NOTE — H&P ADULT - PROBLEM SELECTOR PLAN 1
Likely from pancreatic cancer as per preious CT scan  Patient is DNR/DNI with comfort measures only as per discussion with family  Admit to residents service, any bed  Supplemental O2 as needed  Morphine 2mg q1hr PRN for moderate pain or agitation or resp rate >18. Holding parameters: resp rate <12  Will consult hospice and social work Likely from pancreatic cancer as per previous CT scan  Patient is DNR/DNI with comfort measures only as per discussion with family  Admit to residents service, any bed  Morphine 2mg q1hr PRN for moderate pain or agitation or resp rate >18. Holding parameters: resp rate <12  Zofran q4 hrs PRN for nausea  c/w fentanyl patch 50mcg Q 72hrs  Jones catheter fo comfort care  Diet: Regular with nepro supplement and comfort feedings by family  Nasal canula to keep O2 sat >90%  Will consult hospice and social work Likely from pancreatic cancer as per previous CT scan  Extensive advanced directive discussed with family including health care proxy at bedside, son Jason, HCP and family agreed with continuation of comfort measures only (no blood drawn, no vital signs, no continuations of IV fluids or antibiotics) with possible return to [facility] on hospice care vs home with home hospice  Admit to residents service, any bed  Morphine 2mg q1hr PRN for moderate pain or agitation or resp rate >18. Holding parameters: resp rate <12  Zofran q4 hrs PRN for nausea  c/w fentanyl patch 50mcg Q 72hrs  Jones catheter fo comfort care  Diet: Regular with nepro supplement and comfort feedings by family  Nasal canula to keep O2 sat >90%  Will consult hospice and social work

## 2018-06-18 NOTE — H&P ADULT - NSHPPHYSICALEXAM_GEN_ALL_CORE
General: Well nourished/Well developed, patient sleeping on the bed, arousable, interactive  Head:  Normocephalic, atraumatic  ENT: Mucosa moist, no ulcerations  Neck: Supple, no sinuses or palpable masses  Lymphatic:  No palpable cervical, supraclavicular adenopathy  Respiratory: CTA B/L  CV: RRR, S1S2, no murmur  Abdominal: Firm, generalized tenderness, distended, possibly palpable epigastric mass  Extremities: No edema, + peripheral pulses, FROM all 4 extremity  MUSCULOSKELETAL: tenderness in lumbar spine at the level of L3 to sacrum. Stage 1 1x2 cm eschar ntoed, intact skin, non infection signs noted  Neurology: A&O in person, no focalization signs Vital Signs Last 24 Hrs  T(C): 36.3 (19 Jun 2018 00:25), Max: 38.3 (18 Jun 2018 14:52)  T(F): 97.4 (19 Jun 2018 00:25), Max: 101 (18 Jun 2018 14:52)  HR: 92 (19 Jun 2018 00:25) (85 - 98)  BP: 108/52 (19 Jun 2018 00:25) (86/56 - 108/52)  BP(mean): 75 (19 Jun 2018 00:25) (75 - 75)  RR: 20 (19 Jun 2018 00:25) (20 - 24)  SpO2: 99% (18 Jun 2018 23:44) (97% - 99%)    General: Well nourished/Well developed, patient sleeping on the bed, arousable, interactive  Head:  Normocephalic, atraumatic  ENT: Mucosa moist, no ulcerations  Neck: Supple, no sinuses or palpable masses  Lymphatic:  No palpable cervical, supraclavicular adenopathy  Respiratory: CTA B/L  CV: RRR, S1S2, no murmur  Abdominal: Firm, generalized tenderness, distended, possibly palpable epigastric mass  Extremities: No edema, + peripheral pulses, FROM all 4 extremity  MUSCULOSKELETAL: tenderness in lumbar spine at the level of L3 to sacrum. Stage 1 1x2 cm eschar ntoed, intact skin, non infection signs noted  Neurology: A&O in person, no focalization signs

## 2018-06-18 NOTE — ED ADULT NURSE REASSESSMENT NOTE - NS ED NURSE REASSESS COMMENT FT1
Assuming care from previous RN, pt alert to person and time, denies SOB, resp even and unlabored, skin warm and dry, color good, denies n/v, c/o pain to lower back, will medicate as per MD orders, patent 20G IV in left AC and right wrist, family and pt aware of plan of care, awaiting admission orders, will continue to monitor.

## 2018-06-18 NOTE — ED ADULT NURSE NOTE - OBJECTIVE STATEMENT
pt from NH A0X2 c/o fevers, altered mental, elevated WBC counts and creatine. Code sepsis activated, MD Lorenz at bedside.

## 2018-06-18 NOTE — H&P ADULT - PROBLEM SELECTOR PLAN 3
Patient is DNR/DNI with comfort measures only as per discussion with family Comfort measures with hypoglycemia protocol

## 2018-06-19 ENCOUNTER — TRANSCRIPTION ENCOUNTER (OUTPATIENT)
Age: 83
End: 2018-06-19

## 2018-06-19 VITALS
HEART RATE: 98 BPM | RESPIRATION RATE: 18 BRPM | SYSTOLIC BLOOD PRESSURE: 123 MMHG | OXYGEN SATURATION: 98 % | DIASTOLIC BLOOD PRESSURE: 58 MMHG

## 2018-06-19 DIAGNOSIS — I10 ESSENTIAL (PRIMARY) HYPERTENSION: ICD-10-CM

## 2018-06-19 DIAGNOSIS — C79.9 SECONDARY MALIGNANT NEOPLASM OF UNSPECIFIED SITE: ICD-10-CM

## 2018-06-19 DIAGNOSIS — E11.9 TYPE 2 DIABETES MELLITUS WITHOUT COMPLICATIONS: ICD-10-CM

## 2018-06-19 DIAGNOSIS — A41.9 SEPSIS, UNSPECIFIED ORGANISM: ICD-10-CM

## 2018-06-19 LAB
CULTURE RESULTS: NO GROWTH — SIGNIFICANT CHANGE UP
LACTATE BLDV-MCNC: 1.7 MMOL/L — SIGNIFICANT CHANGE UP (ref 0.5–2)
PROCALCITONIN SERPL-MCNC: 44.99 NG/ML — HIGH (ref 0–0.04)
SPECIMEN SOURCE: SIGNIFICANT CHANGE UP

## 2018-06-19 PROCEDURE — 36415 COLL VENOUS BLD VENIPUNCTURE: CPT

## 2018-06-19 PROCEDURE — 83880 ASSAY OF NATRIURETIC PEPTIDE: CPT

## 2018-06-19 PROCEDURE — 71045 X-RAY EXAM CHEST 1 VIEW: CPT

## 2018-06-19 PROCEDURE — 85730 THROMBOPLASTIN TIME PARTIAL: CPT

## 2018-06-19 PROCEDURE — 80053 COMPREHEN METABOLIC PANEL: CPT

## 2018-06-19 PROCEDURE — 93005 ELECTROCARDIOGRAM TRACING: CPT

## 2018-06-19 PROCEDURE — 85610 PROTHROMBIN TIME: CPT

## 2018-06-19 PROCEDURE — 99239 HOSP IP/OBS DSCHRG MGMT >30: CPT

## 2018-06-19 PROCEDURE — 74176 CT ABD & PELVIS W/O CONTRAST: CPT | Mod: 26

## 2018-06-19 PROCEDURE — 84145 PROCALCITONIN (PCT): CPT

## 2018-06-19 PROCEDURE — 99497 ADVNCD CARE PLAN 30 MIN: CPT | Mod: GC

## 2018-06-19 PROCEDURE — 87086 URINE CULTURE/COLONY COUNT: CPT

## 2018-06-19 PROCEDURE — 96374 THER/PROPH/DIAG INJ IV PUSH: CPT

## 2018-06-19 PROCEDURE — 96375 TX/PRO/DX INJ NEW DRUG ADDON: CPT

## 2018-06-19 PROCEDURE — 83605 ASSAY OF LACTIC ACID: CPT

## 2018-06-19 PROCEDURE — 87040 BLOOD CULTURE FOR BACTERIA: CPT

## 2018-06-19 PROCEDURE — 84484 ASSAY OF TROPONIN QUANT: CPT

## 2018-06-19 PROCEDURE — 99285 EMERGENCY DEPT VISIT HI MDM: CPT | Mod: 25

## 2018-06-19 PROCEDURE — 85027 COMPLETE CBC AUTOMATED: CPT

## 2018-06-19 PROCEDURE — 81001 URINALYSIS AUTO W/SCOPE: CPT

## 2018-06-19 PROCEDURE — 74176 CT ABD & PELVIS W/O CONTRAST: CPT

## 2018-06-19 RX ORDER — INSULIN GLARGINE 100 [IU]/ML
25 INJECTION, SOLUTION SUBCUTANEOUS
Qty: 0 | Refills: 0 | COMMUNITY

## 2018-06-19 RX ORDER — HYDRALAZINE HCL 50 MG
1 TABLET ORAL
Qty: 0 | Refills: 0 | COMMUNITY

## 2018-06-19 RX ORDER — METOPROLOL TARTRATE 50 MG
1 TABLET ORAL
Qty: 0 | Refills: 0 | COMMUNITY

## 2018-06-19 RX ORDER — MORPHINE SULFATE 50 MG/1
2 CAPSULE, EXTENDED RELEASE ORAL
Qty: 0 | Refills: 0 | Status: DISCONTINUED | OUTPATIENT
Start: 2018-06-19 | End: 2018-06-19

## 2018-06-19 RX ORDER — FENTANYL CITRATE 50 UG/ML
1 INJECTION INTRAVENOUS
Qty: 0 | Refills: 0 | COMMUNITY
Start: 2018-06-19

## 2018-06-19 RX ORDER — FUROSEMIDE 40 MG
1 TABLET ORAL
Qty: 0 | Refills: 0 | COMMUNITY

## 2018-06-19 RX ORDER — MAGNESIUM HYDROXIDE 400 MG/1
15 TABLET, CHEWABLE ORAL
Qty: 0 | Refills: 0 | COMMUNITY

## 2018-06-19 RX ORDER — ONDANSETRON 8 MG/1
1 TABLET, FILM COATED ORAL
Qty: 0 | Refills: 0 | COMMUNITY
Start: 2018-06-19

## 2018-06-19 RX ORDER — LORATADINE 10 MG/1
1 TABLET ORAL
Qty: 0 | Refills: 0 | COMMUNITY

## 2018-06-19 RX ORDER — IPRATROPIUM/ALBUTEROL SULFATE 18-103MCG
3 AEROSOL WITH ADAPTER (GRAM) INHALATION
Qty: 0 | Refills: 0 | COMMUNITY

## 2018-06-19 RX ORDER — ATORVASTATIN CALCIUM 80 MG/1
1 TABLET, FILM COATED ORAL
Qty: 0 | Refills: 0 | COMMUNITY

## 2018-06-19 RX ORDER — PANTOPRAZOLE SODIUM 20 MG/1
40 TABLET, DELAYED RELEASE ORAL
Qty: 0 | Refills: 0 | Status: DISCONTINUED | OUTPATIENT
Start: 2018-06-19 | End: 2018-06-19

## 2018-06-19 RX ORDER — POTASSIUM CHLORIDE 20 MEQ
1 PACKET (EA) ORAL
Qty: 0 | Refills: 0 | COMMUNITY

## 2018-06-19 RX ORDER — ASCORBIC ACID 60 MG
1 TABLET,CHEWABLE ORAL
Qty: 0 | Refills: 0 | COMMUNITY

## 2018-06-19 RX ORDER — ONDANSETRON 8 MG/1
1 TABLET, FILM COATED ORAL
Qty: 0 | Refills: 0 | COMMUNITY

## 2018-06-19 RX ORDER — ASPIRIN/CALCIUM CARB/MAGNESIUM 324 MG
1 TABLET ORAL
Qty: 0 | Refills: 0 | COMMUNITY

## 2018-06-19 RX ORDER — CLOPIDOGREL BISULFATE 75 MG/1
1 TABLET, FILM COATED ORAL
Qty: 0 | Refills: 0 | COMMUNITY

## 2018-06-19 RX ADMIN — Medication 166.67 MILLIGRAM(S): at 01:57

## 2018-06-19 RX ADMIN — ONDANSETRON 4 MILLIGRAM(S): 8 TABLET, FILM COATED ORAL at 14:22

## 2018-06-19 RX ADMIN — MORPHINE SULFATE 2 MILLIGRAM(S): 50 CAPSULE, EXTENDED RELEASE ORAL at 14:23

## 2018-06-19 RX ADMIN — SODIUM CHLORIDE 100 MILLILITER(S): 9 INJECTION INTRAMUSCULAR; INTRAVENOUS; SUBCUTANEOUS at 01:58

## 2018-06-19 RX ADMIN — MORPHINE SULFATE 1 MILLIGRAM(S): 50 CAPSULE, EXTENDED RELEASE ORAL at 00:56

## 2018-06-19 NOTE — DISCHARGE NOTE ADULT - OTHER SIGNIFICANT FINDINGS
9.3    60.8  )-----------( 99       ( 2018 15:23 )             29.3       133<L>  |  95<L>  |  67.0<H>  ----------------------------<  217<H>  5.1   |  16.0<L>  |  4.66<H>  Ca    9.3      2018 15:23  TPro  6.6  /  Alb  3.8  /  TBili  2.8<H>  /  DBili  x   /  AST  97<H>  /  ALT  110<H>  /  AlkPhos  292<H>      PT/INR - ( 2018 15:23 )   PT: 16.8 sec;   INR: 1.51 ratio    PTT - ( 2018 15:23 )  PTT:31.5 sec    Lipase, Serum: 16 U/L (18 @ 02:51)  Troponin T, Serum: 0.07 ng/mL (18 @ 15:23)  Procalcitonin, Serum: 44.99 ng/mL (18 @ 02:18)    Urinalysis Basic - ( 2018 15:56 )  Color: Yellow / Appearance: Clear / S.020 / pH: x  Gluc: x / Ketone: Negative  / Bili: Negative / Urobili: Negative mg/dL   Blood: x / Protein: 30 mg/dL / Nitrite: Negative   Leuk Esterase: Trace / RBC: 0-2 /HPF / WBC 0-2   Sq Epi: x / Non Sq Epi: Occasional / Bacteria: Many    < from: CT Abdomen and Pelvis No Cont (18 @ 00:03) >  FINDINGS:   Lower thorax:  Multiple bilateral pulmonary nodules measuring up to 2.1 cm in size, some of which demonstrate central cavitation, compatible with either metastases or atypical pulmonary infection. There are also innumerable tiny pulmonary nodules with the same differential diagnosis.     ABDOMEN:   Liver: Normal. No mass.   Gallbladder and bile ducts:  Cholelithiasis. No cholecystitis or biliary ductal dilatation.   Pancreas: Normal. No ductal dilation.   Spleen: Normal. No splenomegaly. Adrenals: Normal. No mass.   Kidneys and ureters:  There is mildto moderate left-sided hydronephrosis a proximal left hydroureter with transition point of the proximal left   ureter as it courses into the left lateral aspect of the aforementioned retroperitoneal mass, consistent with obstructive uropathy. No obstructive urolithiasis.   Stomach and bowel:  No bowel wall thickening or intestinal obstruction. There is mild fluid distention of the stomach and moderate fluid distention of the duodenum through the mid third portion with transition point as the mass courses through the aforementioned retroperitoneal mass, consistent with partial obstruction secondary to the mass.   Retroperitoneal space:  6 cm mass, presumably malignant, confluent with both the body of the pancreas and the superior periaortic retroperitoneum, most likely primary pancreatic carcinoma.     PELVIS:   Bladder: Unremarkable as visualized. Reproductive:  Prior hysterectomy.     ABDOMEN and PELVIS:   Intraperitoneal space: Normal. No free air. No significant fluid collection.   Bones/joints:  Multilevel chronic compression deformities of the lumbar spine.   Indeterminate sclerosis of the anterosuperior left iliac bone. Metastatic disease not excluded.     IMPRESSION:   1. Multiple bilateral pulmonary nodules measuring up to 2.1 cm in size, some of which demonstrate central cavitation, compatible with either metastases or atypical pulmonary infection. There are also innumerable tiny pulmonary nodules with the same differential diagnosis.   2. 6 cm mass, presumably malignant, confluent with both the body of the pancreas and the superior periaortic retroperitoneum, most likely primary pancreatic carcinoma.   3. There is mild to moderate left-sided hydronephrosis a proximal left hydroureter with transition point of the proximal left ureter as it courses into the left lateral aspect of the aforementioned retroperitoneal mass, consistent with obstructive uropathy. No obstructive urolithiasis.   4. There is mild fluid distention of the stomach and moderate fluid distention of the duodenum through the mid third portion with transition point as the mass courses through the aforementioned retroperitoneal mass, consistent with partial obstruction secondary to the mass.   5. Indeterminate sclerosis of the anterosuperior left iliac bone.   Metastatic disease not excluded.  < end of copied text >    < from: Xray Chest 1 View AP/PA. (18 @ 17:30) >  FINDINGS:  Single frontal viewof the chest demonstrates the lungs to be clear. The cardiomediastinal silhouette is normal. No acute osseous abnormalities. Overlying EKG leads and wires are noted. Left-sided AICD. Right hemidiaphragm is moderately elevated.    IMPRESSION: No acute cardiopulmonary disease process.    < end of copied text >

## 2018-06-19 NOTE — PROGRESS NOTE ADULT - ASSESSMENT
89 YOF with PMH of DM, compression fracture, HTN, COPD, abdominal mass on CT scan (sept 2017) admitted for sepsis and metastasis probably 2/2 pneumonia due to metastasis to multiple organs. On comfort measures only, waiting for palliative care and SW consult to arrange hospice according to discussion with family    Metastasis.    Likely from pancreatic cancer as per previous CT scan  Extensive advanced directive discussed with family including health care proxy at bedside, son Jason, HCP and family agreed with continuation of comfort measures only (no blood drawn, no vital signs, no continuations of IV fluids or antibiotics) with possible return to [facility] on hospice care vs home with home hospice  c/w morphine 2mg q1hr PRN for moderate pain or agitation or resp rate >18. Holding parameters: resp rate <12  c/w Zofran q4 hrs PRN for nausea  c/w fentanyl patch 50mcg Q 72hrs  c/w Jones catheter fo comfort care  Diet: Regular with nepro supplement and comfort feedings by family  Nasal canula to keep O2 sat >90%  F/U consults with palliative care and social work for hospice arrangements      Sepsis, due to unspecified organism.    Likely 2/2 pneumonia due to lung mets  Patient wanted a trial of ABX and fluids while pending CAT scan. Now s/p Rocephin 1g, Vanco 125mg x1 dose and 2L NS bolus  Patient is DNR/DNI with comfort measures only as per discussion with family.        Diabetes.    Comfort measures with hypoglycemia protocol.       Hypertension.    Patient is DNR/DNI with comfort measures only.  Currently hypotensive. No need for hypertensive medications at this time. 89 YOF with PMH of DM, compression fracture, HTN, COPD, abdominal mass on CT scan (sept 2017) admitted for sepsis and metastasis probably 2/2 pneumonia due to metastasis to multiple organs. On comfort measures only, waiting for palliative care and SW consult to arrange hospice according to discussion with family    Metastasis.    Likely from pancreatic cancer as per previous CT scan  Extensive advanced directive discussed with family including health care proxy at bedside, son Jason, HCP and family agreed with continuation of comfort measures only (no blood drawn, no vital signs, no continuations of IV fluids or antibiotics) with possible return to [facility] on hospice care vs home with home hospice. MOLST form in physical chart  c/w morphine 2mg q1hr PRN for moderate pain or agitation or resp rate >18. Holding parameters: resp rate <12  c/w Zofran q4 hrs PRN for nausea  c/w fentanyl patch 50mcg Q 72hrs  c/w Jones catheter fo comfort care  Diet: Regular with nepro supplement and comfort feedings by family  Nasal canula to keep O2 sat >90%  F/U consults with palliative care and social work for hospice arrangements      Sepsis, due to unspecified organism.    Likely 2/2 pneumonia due to lung mets  Patient wanted a trial of ABX and fluids while pending CAT scan. Now s/p Rocephin 1g, Vanco 125mg x1 dose and 2L NS bolus  Patient is DNR/DNI with comfort measures only as per discussion with family.        Diabetes.    Comfort measures with hypoglycemia protocol.       Hypertension.    Patient is DNR/DNI with comfort measures only.  Currently hypotensive. No need for hypertensive medications at this time. 89 YOF with PMH of DM, compression fracture, HTN, COPD, abdominal mass on CT scan (sept 2017) admitted for sepsis and metastasis probably 2/2 pneumonia due to metastasis to multiple organs. On comfort measures only, waiting for palliative care and SW consult to arrange hospice according to discussion with family    Metastasis.    Likely from pancreatic cancer as per previous CT scan  Extensive advanced directive discussed with family including health care proxy daughter in law who wants to go with comfort route and have patient go back to [facility] as such asap   c/w morphine 2mg q1hr PRN for moderate pain or agitation or resp rate >18. Holding parameters: resp rate <12  c/w Zofran q4 hrs PRN for nausea  c/w fentanyl patch 50mcg Q 72hrs  c/w Jones catheter fo comfort care  Diet: Regular with nepro supplement and comfort feedings by family  Nasal canula to keep O2 sat >90%  F/U consults with palliative care and social work for hospice arrangements      Sepsis, due to unspecified organism.    Likely 2/2 pneumonia due to lung mets  Patient wanted a trial of ABX and fluids while pending CAT scan. Now s/p Rocephin 1g, Vanco 125mg x1 dose and 2L NS bolus  Patient is DNR/DNI with comfort measures only as per discussion with family.        Diabetes.    Comfort measures with hypoglycemia protocol.       Hypertension.    Patient is DNR/DNI with comfort measures only.  Currently hypotensive. No need for hypertensive medications at this time.     Dispo: plan to go back to [facility] today with comfort measures only

## 2018-06-19 NOTE — DISCHARGE NOTE ADULT - PLAN OF CARE
Comfort measures Given the degree of dissemination of the metastasis and the age of Mrs. Villalpando, it was agreed with family and healthcare proxy that the goals of medical care would be the patient's comfort. Upon discharge, she will be treated symptomatically to optimize her pain and to keep her as comfortable as possible. As above As above  Given 2 doses of antibiotics but decision made to send patient back to [facility] on full comfort measures

## 2018-06-19 NOTE — GOALS OF CARE CONVERSATION - PERSONAL ADVANCE DIRECTIVE - CONVERSATION DETAILS
Mtg with Pt and family to review Plan of Care.  Pt is awake and alert.  No complaints of pain or discomfort.  Family @ bedside preparing to have Pt transferred back to [facility].  MOLST form in chart.  Pt will remain on Comfort Care when transferred.  Palliative will follow if needed.

## 2018-06-19 NOTE — DISCHARGE NOTE ADULT - CARE PLAN
Principal Discharge DX:	Metastatic malignant neoplasm, unspecified site  Goal:	Comfort measures  Assessment and plan of treatment:	Given the degree of dissemination of the metastasis and the age of Mrs. Villalpando, it was agreed with family and healthcare proxy that the goals of medical care would be the patient's comfort. Upon discharge, she will be treated symptomatically to optimize her pain and to keep her as comfortable as possible.  Secondary Diagnosis:	Sepsis, due to unspecified organism  Assessment and plan of treatment:	As above Principal Discharge DX:	Metastatic malignant neoplasm, unspecified site  Goal:	Comfort measures  Assessment and plan of treatment:	Given the degree of dissemination of the metastasis and the age of Mrs. Villalpando, it was agreed with family and healthcare proxy that the goals of medical care would be the patient's comfort. Upon discharge, she will be treated symptomatically to optimize her pain and to keep her as comfortable as possible.  Secondary Diagnosis:	Sepsis, due to unspecified organism  Assessment and plan of treatment:	As above  Given 2 doses of antibiotics but decision made to send patient back to [facility] on full comfort measures

## 2018-06-19 NOTE — DISCHARGE NOTE ADULT - MEDICATION SUMMARY - MEDICATIONS TO STOP TAKING
I will STOP taking the medications listed below when I get home from the hospital:    hydrALAZINE 25 mg oral tablet  -- 1 tab(s) by mouth 2 times a day    cranberry oral capsule  -- 400 milligram(s) by mouth once a day    DuoNeb 0.5 mg-2.5 mg/3 mL inhalation solution  -- 3 milliliter(s) inhaled 4 times a day, As Needed    loratadine 10 mg oral tablet  -- 1 tab(s) by mouth once a day    bisacodyl 10 mg rectal suppository  -- 1 suppository(ies) rectally once a day, As Needed    aspirin 81 mg oral tablet  -- 1 tab(s) by mouth once a day    metoprolol tartrate 100 mg oral tablet  -- 1 tab(s) by mouth 2 times a day    Milk of Magnesia 8% oral suspension  -- 15 milliliter(s) by mouth once a day (at bedtime), As Needed    aluminum hydroxide-magnesium hydroxide 200 mg-200 mg/5 mL oral suspension  -- 30 milliliter(s) by mouth every 4 hours, As needed, Dyspepsia    famotidine 20 mg oral tablet  -- 1 tab(s) by mouth 2 times a day    sucralfate 1 g/10 mL oral suspension  -- 10 milliliter(s) by mouth 4 times a day    ascorbic acid 500 mg oral tablet  -- 1 tab(s) by mouth once a day    Lantus 100 units/mL subcutaneous solution  -- 25 unit(s) subcutaneous once a day (at bedtime)    clopidogrel 75 mg oral tablet  -- 1 tab(s) by mouth once a day    atorvastatin 40 mg oral tablet  -- 1 tab(s) by mouth once a day    ondansetron 4 mg oral tablet  -- 1 tab(s) by mouth every 6 hours, As Needed    furosemide 40 mg oral tablet  -- 1 tab(s) by mouth once a day    potassium chloride 20 mEq oral granule, extended release  -- 1 tab(s) by mouth once a day

## 2018-06-19 NOTE — INPATIENT CERTIFICATION FOR MEDICARE PATIENTS - RISKS OF ADVERSE EVENTS
Concern for worsening infectious process/Concern for delay in diagnosis and treatment/Concern for renal deterioration

## 2018-06-19 NOTE — DISCHARGE NOTE ADULT - MEDICATION SUMMARY - MEDICATIONS TO TAKE
I will START or STAY ON the medications listed below when I get home from the hospital:    fentaNYL 50 mcg/hr transdermal film, extended release  -- 1 patch by transdermal patch every 72 hours  -- Indication: For Pain    ondansetron 4 mg oral disintegrating strip  -- 1 each by mouth 3 times a day  -- Indication: For Nausea    Fleet Enema 7 g-19 g rectal enema  -- 133 milliliter(s) rectally once, As Needed  -- Indication: For Constipation    melatonin 3 mg oral tablet  -- 2 tab(s) by mouth once a day (at bedtime), As Needed  -- Indication: For Insomnia    Azopt 1% ophthalmic suspension  -- 1 drop(s) to each affected eye 2 times a day  -- Indication: For Glaucoma    latanoprost 0.005% ophthalmic solution  -- 1 drop(s) to each affected eye once a day (in the evening)  -- Indication: For Glaucoma    Ocuvite Lutein oral tablet  -- 1 tab(s) by mouth once a day  -- Indication: For Supplement

## 2018-06-19 NOTE — DISCHARGE NOTE ADULT - HOSPITAL COURSE
89 YOF with PMH of DM, compression fracture, HTN, COPD, abdominal mass on CT scan (sept 2017) admitted for sepsis and metastasis probably 2/2 pneumonia due to metastasis to multiple organs. On comfort measures only, waiting for palliative care and SW consult to arrange hospice according to discussion with family 89 YOF with PMH of DM, compression fracture, HTN, COPD, abdominal mass on CT scan (sept 2017) who was sent from [facility] because of fever (104F). Patient has also been intermittently disoriented for the past 8 weeks. She was treated for a UTI 3 weeks ago, with mild improvement but still with intermittent AMS. Patient finished ABXs (unspecified) 5 days prior to admission. On the day of presentation, patient had x1 episode of dark colored emesis.  She has been complaining of abdominal pain and had 2 watery bowel movements. She also had a temp of 104. Patient is bedridden but was using wheelchair previously.   In the ED patient came hypotensive and lethargic, code sepsis was called. She received Ceftiaxone 1g, ketorolac 30mg and 2L of NS bolus, She was admitted for sepsis and metastasis. On comfort measures only after discussion with daughter in law who is the healthcare proxy. With her family at bedside, services provided in hospice were explained and questions were answered, family in agreement with plan of discharge back to nursing home ([facility]) and to receive hospice care in that facility.    Time spent coordinating this discharge: 45 minutes. 89 YOF with PMH of DM, compression fracture, HTN, COPD, abdominal mass on CT scan (sept 2017) who was sent from [facility] because of fever (104F). Patient has also been intermittently disoriented for the past 8 weeks. She was treated for a UTI 3 weeks ago, with mild improvement but still with intermittent AMS. Patient finished ABXs (unspecified) 5 days prior to admission. On the day of presentation, patient had x1 episode of dark colored emesis.  She has been complaining of abdominal pain and had 2 watery bowel movements. She also had a temp of 104. Patient is bedridden but was using wheelchair previously.   In the ED patient came hypotensive and lethargic, code sepsis was called. She received Ceftiaxone 1g, ketorolac 30mg and 2L of NS bolus, She was admitted for sepsis and metastasis. On comfort measures only after discussion with daughter in law who is the healthcare proxy. With her family at bedside, services provided in hospice were explained and questions were answered, family in agreement with plan of discharge back to nursing home ([facility]) and to receive hospice care in that facility.  Only comfort medications prescribed for planned d/c to [facility]     Time spent coordinating this discharge: 45 minutes.  Discussed with HCP daughter in law at bedside in detail.

## 2018-06-19 NOTE — PROGRESS NOTE ADULT - ATTENDING COMMENTS
Note addended where needed. Plan discussed with patients HCP at bedside.   Prognosis poor   Please see discharge papers for details.

## 2018-06-19 NOTE — DISCHARGE NOTE ADULT - PATIENT PORTAL LINK FT
You can access the [facility] Patient Portal, offered by [facility], by registering with the following website: [email or web address]

## 2018-06-23 LAB
CULTURE RESULTS: SIGNIFICANT CHANGE UP
CULTURE RESULTS: SIGNIFICANT CHANGE UP
SPECIMEN SOURCE: SIGNIFICANT CHANGE UP
SPECIMEN SOURCE: SIGNIFICANT CHANGE UP

## 2018-10-03 NOTE — PROGRESS NOTE ADULT - PROBLEM SELECTOR PROBLEM 3
Hospitalist Progress Note    Patient:  Kathie Paniagua      Unit/Bed:4K-10/010-A    YOB: 1960    MRN: [id number]       Acct: [de-identified]     PCP: AJITH Wheeler CNP    Date of Admission: 10/1/2018    Chief Complaint: passing blood    Hospital Course:  Pt with MS, hx RY gastric bypass; presents with black/red stool and drop in h/h. Has had multiple x before, capsule endoscopy with no obvious bleeding point. No nsaids, nonsmoker. She has acute blood loss anemia and ugi bleed. She underwent coil embolization of gastroduodenal artery 10.2 with evidence of bleeding from this artery.    hgb is 7.6 today.   Still npo    Subjective: nausea       Medications:  Reviewed    Infusion Medications    pantoprozole (PROTONIX) infusion 8 mg/hr (10/02/18 1909)    sodium chloride 75 mL/hr at 10/02/18 2011     Scheduled Medications    sodium chloride  250 mL Intravenous Once    iron sucrose  100 mg Intravenous Once    calcium replacement protocol   Other RX Placeholder    sodium chloride flush  10 mL Intravenous 2 times per day    dimethyl fumarate  240 mg Oral BID    lamoTRIgine  200 mg Oral Daily    levothyroxine  125 mcg Oral Daily    prenatal vitamin  1 tablet Oral Daily    vilazodone HCl  40 mg Oral Daily    cyanocobalamin  1,000 mcg Oral Daily    lithium  600 mg Oral Nightly    lithium  300 mg Oral Daily    influenza virus vaccine  0.5 mL Intramuscular Once     PRN Meds: sodium chloride flush, magnesium hydroxide, ondansetron, acetaminophen, nitroGLYCERIN      Intake/Output Summary (Last 24 hours) at 10/03/18 0954  Last data filed at 10/03/18 7444   Gross per 24 hour   Intake            771.5 ml   Output             2900 ml   Net          -2128.5 ml       Diet:  Diet NPO Time Specified    Exam:  BP (!) 115/54   Pulse 60   Temp 97.9 °F (36.6 °C) (Oral)   Resp 14   Ht 4' 10\" (1.473 m)   Wt 114 lb 9.6 oz (52 kg)   SpO2 99%   BMI 23.95 kg/m²     General appearance: No apparent distress, appears stated age and cooperative. HEENT: Pupils equal, round, and reactive to light. Conjunctivae/corneas clear. Neck: Supple, with full range of motion. No jugular venous distention. Trachea midline. Respiratory:  Normal respiratory effort. Clear to auscultation, bilaterally without Rales/Wheezes/Rhonchi. Cardiovascular: Regular rate and rhythm with normal S1/S2 , questionable soft murmur at apex  Abdomen: Soft,  Some ruq tenderness, normal bowel sounds  Musculoskeletal: congenital anamoly right arm  Skin: Skin color, texture, turgor normal.  No rashes or lesions. Neurologic:  Neurovascularly intact without any focal sensory/motor deficits. Cranial nerves: II-XII intact, grossly non-focal.  Psychiatric: Alert and oriented, thought content appropriate, normal insight  Capillary Refill: Brisk,< 3 seconds   Peripheral Pulses: +2 palpable, equal bilaterally       Labs:   Recent Labs      10/01/18   2149  10/02/18   1452  10/02/18   2320  10/03/18   0920   WBC  5.9   --    --    --    HGB  7.8*  8.0*  6.8*  7.6*   HCT  23.8*  22.4*  20.9*  23.0*   PLT  169   --    --    --      Recent Labs      10/01/18   2149  10/02/18   2055  10/03/18   0920   NA  141  140  142   K  4.1  5.3*  4.0   CL  112*  116*  113*   CO2  21*  15*  20*   BUN  28*  30*  30*   CREATININE  0.4  0.3*  0.3*   CALCIUM  7.9*  8.8  8.8     Recent Labs      10/01/18   2149   AST  14   ALT  39   BILIDIR  <0.2   BILITOT  0.3   ALKPHOS  77     No results for input(s): INR in the last 72 hours. No results for input(s): Amedeo Davion in the last 72 hours.     Urinalysis:      Lab Results   Component Value Date    NITRU NEGATIVE 09/28/2018    WBCUA 0-2 09/28/2018    BACTERIA NONE 09/28/2018    RBCUA 0-2 09/28/2018    BLOODU NEGATIVE 09/28/2018    SPECGRAV >1.030 05/13/2016    GLUCOSEU NEGATIVE 09/28/2018       Radiology:  IR ANGIOGRAM VISCERAL GASTRIC   Final Result   Successful, uncomplicated coil embolization of the gastroduodenal artery Diabetes

## 2019-10-07 NOTE — ED ADULT NURSE NOTE - BREATHING
PAST SURGICAL HISTORY:  Disorder of spine unthetethering 2 x    H/O Spinal surgery laminectomies 2014    History of corneal transplant left corneal transplant on 5/21/2018    Presence of IVC filter 2014 ?    S/P aortic dissection repair Type A Dissection repair /2009   descending aortic aneurysm repair 9/2016 spontaneous

## 2019-12-03 NOTE — PHYSICAL THERAPY INITIAL EVALUATION ADULT - CRITERIA FOR SKILLED THERAPEUTIC INTERVENTIONS
therapy frequency/impairments found/functional limitations in following categories/predicted duration of therapy intervention/rehab potential/anticipated equipment needs at discharge/anticipated discharge recommendation/risk reduction/prevention
Detail Level: Simple
Comment: Secondary to nickel from the button on patient’s jeans
Comment: Discussed diagnosis with pt

## 2021-03-05 NOTE — PROGRESS NOTE ADULT - ASSESSMENT
New heartburn with exacerbation of COPD and use of IV steroids.  Poor candidate for EGD.  Treating empirically.  Observation. show

## 2021-09-09 NOTE — PROGRESS NOTE ADULT - ASSESSMENT
Final Anesthesia Post-op Assessment    Patient: Kimberli Engel  Procedure(s) Performed: LEFT EXTRACTION, CATARACT, WITH IOL INSERTION - LEFT  Anesthesia type: MAC    Vitals Value Taken Time   Temp 36.2 (97.2) 09/09/21 1214   Pulse 62 09/09/21 1250   Resp 16 09/09/21 1250   SpO2 95 % 09/09/21 1250   /59 09/09/21 1250         Patient Location: PACU Phase 2  Post-op Vital Signs:stable  Level of Consciousness: participates in exam, awake, oriented, alert and answers questions appropriately  Respiratory Status: spontaneous ventilation, unassisted and room air  Cardiovascular stable and blood pressure returned to baseline  Hydration: euvolemic  Pain Management: adequately controlled  Nausea: None  Airway Patency:patent  Post-op Assessment: awake, alert, appropriately conversant, or baseline, no complications and patient tolerated procedure well with no complications  Comments:   Patient reassessed and is appropriate for discharge to home.    Davi Chen MD  Anesthesiology      No complications documented.    89 yr old female resident of [facility] with diabetes mellitus, hypertension, hyperlipidemia, CHF, COPD admitted with complaints of exertional shortness of breath and wheezing. She was started on IV steroids for COPD exacerbation and Lasix for CHF exacerbation. CXR negative for infiltrate. Noted to have mild elevation on creatinine, which per family, has chronic problems with her kidneys. ECHO was done, which revealed improved EF and diastolic dysfunction. Her kidney function worsened. Renal was consulted. Her steroids were tapered.     > Gastritis / GERD - GI consulted and discussed with Dr. Robbins.  Add Carafate.  Continue PPI.  No further plans for intervention for now.   ... See below

## 2022-06-28 NOTE — ED ADULT NURSE NOTE - NS PRO AD PATIENT TYPE
pt received room 2, alert & awake, A&Ox4. Pt complaint of dysuria & urinary retention. Pt had Whiting removed yesterday & had been experiencing symptoms since yesterday. Pt poor historian unaware of PMH, PSH, meds. Pt tenderness on palpation pelvis. Unable to place new MD johanne aware & urology paged to place new whiting catheter. Callbell @ bedside Health Care Proxy (HCP)

## 2023-08-22 NOTE — ED ADULT NURSE NOTE - CHIEF COMPLAINT QUOTE
08/22/23 1455   Readmission Assessment   Number of Days since last admission? 8-30 days   Previous Disposition Home with Family   Who is being Interviewed Patient   What was the patient's/caregiver's perception as to why they think they needed to return back to the hospital? Could not/did not make appointment with PCP   Did you visit your Primary Care Physician after you left the hospital, before you returned this time? No   Why weren't you able to visit your PCP? Other (Comment)  (did not go)   Did you see a specialist, such as Cardiac, Pulmonary, Orthopedic Physician, etc. after you left the hospital? No   Who advised the patient to return to the hospital? Self-referral   Does the patient report anything that got in the way of taking their medications? No   In our efforts to provide the best possible care to you and others like you, can you think of anything that we could have done to help you after you left the hospital the first time, so that you might not have needed to return so soon?  Other (Comment)  (symptoms worsen)     Brianna Demarco LMSW, [address] [place] [address] Social Work Case Management   Phone: [phone]  Fax: [phone] marysol arrived via ambulance states that she was having difficulty breathing  about one hour prior to arrival patient received a respitory treatment prior to arrival -states that she has had a cough for a while. carlot on 3 liters nasal cannula with wheezing noted. Md at bedside. lower edema noted

## 2025-04-22 NOTE — DISCHARGE NOTE ADULT - NURSING SECTION COMPLETE
No solid food/dairy/candy/gum after midnight; water allowed before 10:00am tomorrow; patient reminded to come with photo ID/insurance/credit card; dress in comfortable clothes; no jewelries/contact lens/valuable; no smoking/alcohol drinking/recreational drug use tonight; escort to have photo ID; address and callback number was given/yes
Patient/Caregiver provided printed discharge information.